# Patient Record
Sex: MALE | Race: WHITE | Employment: FULL TIME | URBAN - NONMETROPOLITAN AREA
[De-identification: names, ages, dates, MRNs, and addresses within clinical notes are randomized per-mention and may not be internally consistent; named-entity substitution may affect disease eponyms.]

---

## 2018-09-06 ENCOUNTER — HOSPITAL ENCOUNTER (OUTPATIENT)
Age: 58
Setting detail: OBSERVATION
Discharge: HOME | End: 2018-09-07
Attending: EMERGENCY MEDICINE | Admitting: HOSPITALIST
Payer: COMMERCIAL

## 2018-09-06 ENCOUNTER — APPOINTMENT (OUTPATIENT)
Dept: CT IMAGING | Age: 58
End: 2018-09-06
Payer: COMMERCIAL

## 2018-09-06 DIAGNOSIS — I67.1 INTRACRANIAL ANEURYSM: Primary | ICD-10-CM

## 2018-09-06 DIAGNOSIS — R53.83 OTHER FATIGUE: ICD-10-CM

## 2018-09-06 DIAGNOSIS — R00.1 BRADYCARDIA: ICD-10-CM

## 2018-09-06 LAB
ALBUMIN SERPL-MCNC: 4.1 G/DL (ref 3.5–5.2)
ALP BLD-CCNC: 71 U/L (ref 40–130)
ALT SERPL-CCNC: 14 U/L (ref 5–41)
ANION GAP SERPL CALCULATED.3IONS-SCNC: 9 MMOL/L (ref 7–19)
AST SERPL-CCNC: 18 U/L (ref 5–40)
BILIRUB SERPL-MCNC: 0.4 MG/DL (ref 0.2–1.2)
BILIRUBIN URINE: NEGATIVE
BLOOD, URINE: NEGATIVE
BUN BLDV-MCNC: 13 MG/DL (ref 8–23)
CALCIUM SERPL-MCNC: 8.9 MG/DL (ref 8.8–10.2)
CHLORIDE BLD-SCNC: 102 MMOL/L (ref 98–111)
CLARITY: CLEAR
CO2: 27 MMOL/L (ref 22–29)
COLOR: YELLOW
CREAT SERPL-MCNC: 0.9 MG/DL (ref 0.5–1.2)
GFR NON-AFRICAN AMERICAN: >60
GLUCOSE BLD-MCNC: 119 MG/DL (ref 74–109)
GLUCOSE URINE: NEGATIVE MG/DL
HCT VFR BLD CALC: 42.5 % (ref 42–52)
HEMOGLOBIN: 14.4 G/DL (ref 14–18)
KETONES, URINE: NEGATIVE MG/DL
LEUKOCYTE ESTERASE, URINE: NEGATIVE
MAGNESIUM: 2 MG/DL (ref 1.6–2.4)
MCH RBC QN AUTO: 31.2 PG (ref 27–31)
MCHC RBC AUTO-ENTMCNC: 33.9 G/DL (ref 33–37)
MCV RBC AUTO: 92.2 FL (ref 80–94)
NITRITE, URINE: NEGATIVE
PDW BLD-RTO: 13.1 % (ref 11.5–14.5)
PERFORMED ON: NORMAL
PERFORMED ON: NORMAL
PH UA: 7.5
PLATELET # BLD: 193 K/UL (ref 130–400)
PMV BLD AUTO: 9.9 FL (ref 9.4–12.4)
POC TROPONIN I: 0 NG/ML (ref 0–0.08)
POC TROPONIN I: 0.01 NG/ML (ref 0–0.08)
POTASSIUM SERPL-SCNC: 3.9 MMOL/L (ref 3.5–5)
PRO-BNP: 230 PG/ML (ref 0–900)
PROTEIN UA: NEGATIVE MG/DL
RBC # BLD: 4.61 M/UL (ref 4.7–6.1)
SEDIMENTATION RATE, ERYTHROCYTE: 3 MM/HR (ref 0–15)
SODIUM BLD-SCNC: 138 MMOL/L (ref 136–145)
SPECIFIC GRAVITY UA: 1.02
TOTAL PROTEIN: 6.7 G/DL (ref 6.6–8.7)
TROPONIN: <0.01 NG/ML (ref 0–0.03)
TSH SERPL DL<=0.05 MIU/L-ACNC: 0.78 UIU/ML (ref 0.27–4.2)
URINE REFLEX TO CULTURE: NORMAL
UROBILINOGEN, URINE: 0.2 E.U./DL
WBC # BLD: 8.5 K/UL (ref 4.8–10.8)

## 2018-09-06 PROCEDURE — 85652 RBC SED RATE AUTOMATED: CPT

## 2018-09-06 PROCEDURE — 99285 EMERGENCY DEPT VISIT HI MDM: CPT | Performed by: EMERGENCY MEDICINE

## 2018-09-06 PROCEDURE — 81003 URINALYSIS AUTO W/O SCOPE: CPT

## 2018-09-06 PROCEDURE — 84484 ASSAY OF TROPONIN QUANT: CPT

## 2018-09-06 PROCEDURE — 70496 CT ANGIOGRAPHY HEAD: CPT

## 2018-09-06 PROCEDURE — 99218 PR INITIAL OBSERVATION CARE/DAY 30 MINUTES: CPT | Performed by: NEUROLOGICAL SURGERY

## 2018-09-06 PROCEDURE — G0378 HOSPITAL OBSERVATION PER HR: HCPCS

## 2018-09-06 PROCEDURE — 83880 ASSAY OF NATRIURETIC PEPTIDE: CPT

## 2018-09-06 PROCEDURE — 93005 ELECTROCARDIOGRAM TRACING: CPT

## 2018-09-06 PROCEDURE — 83735 ASSAY OF MAGNESIUM: CPT

## 2018-09-06 PROCEDURE — 36415 COLL VENOUS BLD VENIPUNCTURE: CPT

## 2018-09-06 PROCEDURE — 85027 COMPLETE CBC AUTOMATED: CPT

## 2018-09-06 PROCEDURE — 70450 CT HEAD/BRAIN W/O DYE: CPT

## 2018-09-06 PROCEDURE — 84443 ASSAY THYROID STIM HORMONE: CPT

## 2018-09-06 PROCEDURE — 99219 PR INITIAL OBSERVATION CARE/DAY 50 MINUTES: CPT | Performed by: HOSPITALIST

## 2018-09-06 PROCEDURE — 6360000004 HC RX CONTRAST MEDICATION: Performed by: EMERGENCY MEDICINE

## 2018-09-06 PROCEDURE — 80053 COMPREHEN METABOLIC PANEL: CPT

## 2018-09-06 PROCEDURE — 99285 EMERGENCY DEPT VISIT HI MDM: CPT

## 2018-09-06 PROCEDURE — 99244 OFF/OP CNSLTJ NEW/EST MOD 40: CPT | Performed by: INTERNAL MEDICINE

## 2018-09-06 RX ORDER — ACYCLOVIR 200 MG/1
200 CAPSULE ORAL DAILY
COMMUNITY

## 2018-09-06 RX ORDER — DIAZEPAM 5 MG/1
10 TABLET ORAL EVERY EVENING
Status: DISCONTINUED | OUTPATIENT
Start: 2018-09-06 | End: 2018-09-07 | Stop reason: HOSPADM

## 2018-09-06 RX ORDER — ACYCLOVIR 200 MG/1
200 CAPSULE ORAL
Status: DISCONTINUED | OUTPATIENT
Start: 2018-09-07 | End: 2018-09-07 | Stop reason: HOSPADM

## 2018-09-06 RX ORDER — DIAZEPAM 10 MG/1
10 TABLET ORAL NIGHTLY
COMMUNITY

## 2018-09-06 RX ADMIN — IOPAMIDOL 90 ML: 755 INJECTION, SOLUTION INTRAVENOUS at 14:14

## 2018-09-06 ASSESSMENT — ENCOUNTER SYMPTOMS
DIARRHEA: 0
SHORTNESS OF BREATH: 0
ABDOMINAL PAIN: 0
EYE PAIN: 0
VOMITING: 0

## 2018-09-06 ASSESSMENT — PAIN SCALES - GENERAL: PAINLEVEL_OUTOF10: 0

## 2018-09-06 NOTE — ED PROVIDER NOTES
[facility]  eMERGENCY dEPARTMENT eNCOUnter      Pt Name: Molly Carrillo  MRN: [id number]  [place] 1960  Date of evaluation: 9/6/2018  Provider: Rajwinder Montgomery MD    Emergency Department care of this patient was assumed at 1500 from Dr. Yogi Salinas. We have discussed the case and the plan of care. I have seen and evaluated patient and reviewed ED course. CHIEF COMPLAINT       Chief Complaint   Patient presents with    Bradycardia         PHYSICAL EXAM    (up to 7 for level 4, 8 or more for level 5)     ED Triage Vitals [09/06/18 1222]   BP Temp Temp Source Pulse Resp SpO2 Height Weight   (!) 161/90 97.4 °F (36.3 °C) Temporal 55 18 96 % 6' 2\" (1.88 m) 194 lb (88 kg)       Physical Exam    DIAGNOSTIC RESULTS         RADIOLOGY:   Non-plain film images such as CT, Ultrasound and MRI are read by the radiologist. Plain radiographic images are visualized and preliminarily interpreted by the emergency physician with the below findings:    CTA HEAD W WO CONTRAST   Final Result   1. 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 This is nonenhancing on the angiogram and I suspect that   it is thrombosed. Signed by Dr Sd De La Torre on 9/6/2018 3:01 PM      CT Head WO Contrast   Final Result   1. 4 mm calcific density projecting at the margin of the fourth   ventricle along the middle cerebellar peduncle. This could reflect rim   calcification along a PICA branch aneurysm. Consider CT brain   angiogram.   2. No subarachnoid hemorrhage identified.    3. Otherwise normal exam.   Signed by Dr Sd De La Torre on 9/6/2018 1:55 PM              LABS:  Labs Reviewed   CBC - Abnormal; Notable for the following:        Result Value    RBC 4.61 (*)     MCH 31.2 (*)     All other components within normal limits   COMPREHENSIVE METABOLIC PANEL - Abnormal; Notable for the following:     Glucose 119 (*)     All other components within normal limits   SEDIMENTATION RATE   TSH WITHOUT REFLEX   URINE RT REFLEX TO CULTURE   POCT TROPONIN   POCT TROPONIN   POCT VENOUS   POCT VENOUS       All other labs were within normal range or not returned as of this dictation. EMERGENCY DEPARTMENT COURSE and DIFFERENTIAL DIAGNOSIS/MDM:   Vitals:    Vitals:    09/06/18 1500 09/06/18 1600 09/06/18 1700 09/06/18 1800   BP: (!) 142/88 138/78 136/74 132/70   Pulse: 51 54 52 52   Resp: 18 18 18 18   Temp:       TempSrc:       SpO2: 96% 95% 96% 97%   Weight:       Height:           MDM      CONSULTS:    Case was reviewed with Dr. Lele Araujo regarding patient's CT angiogram findings along with possible stress test. 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 Case was discussed with Dr. Pretty George who is agreeable for admission to the hospitalist service. Dr. Katja John was notified on consultation. PROCEDURES:  Unless otherwise noted below, none     Procedures    FINAL IMPRESSION      1. Intracranial aneurysm    2. Bradycardia    3.  Other fatigue          DISPOSITION/PLAN   DISPOSITION Admitted    (Please note that portions of this note were completed with a voice recognition program.  Efforts were made to edit the dictations but occasionally words are mis-transcribed.)    Opal Hope MD (electronically signed)  Attending Emergency Physician         Opal Hope MD  09/07/18 7019

## 2018-09-06 NOTE — H&P
History and Physical    Patient Name:  Marcel Bence    :  1960    Chief Complaint:   symptomatic bradycardia     History of Present Illness:   Marcel Bence presents to [facility] with bradycardia and feeling off with associated fatigue. He left work and on its way home became diaphoretic. he went to his PCP who on EKG found him being bradycardic. CT head incidentally revealed a small 3-4 mm hyperdenstiy adjacent to the 4th ventricle which the radiologyist suggested could be and aneurysm which prompted a CTA. Due to his bradycardia, the cardiologist, Dr. Phan Ojeda suggested a stress test be performed and wanted neurosurgery clearance before the procedure.             Past Medical History:   has a past medical history of Herpes. Surgical History:   has a past surgical history that includes Appendectomy. Social History:   reports that he has never smoked. He has never used smokeless tobacco. He reports that he does not drink alcohol or use drugs. Family History:  Father has HTN     Medications:  None     Allergies:  Sulfa antibiotics     Review of Systems:   · Constitutional: there has been no unanticipated weight loss. There's been change in energy level, sleep pattern, or activity level. · Eyes: No visual changes or diplopia. No scleral icterus. · ENT: Headaches, no hearing loss or vertigo. No mouth sores or sore throat. · Cardiovascular: No chest pain, dyspnea on exertion, palpitations or loss of consciousness. No cough, hemoptysis, pleuritic pain, or phlebitis. · Respiratory: No cough or wheezing, no sputum production. No hemoptysis. · Gastrointestinal: No abdominal pain, appetite loss, blood in stools. No change in bowel or bladder habits. · Genitourinary: No dysuria, trouble voiding, or hematuria. · Musculoskeletal:  No gait disturbance, weakness or joint complaints. · Integumentary: No rash or pruritis.   · Neurological: headache, no diplopia, change in muscle strength, numbness or tingling. No change in gait, balance, coordination, mood, affect, memory, mentation, behavior. · Psychiatric: No anxiety, or depression. · Endocrine: No temperature intolerance. No excessive thirst, fluid intake, or urination. No tremor. · Hematologic/Lymphatic: No abnormal bruising or bleeding, blood clots or swollen lymph nodes. · Allergic/Immunologic: No nasal congestion or hives. Physical Examination:    Vital Signs: /70   Pulse 52   Temp 97.4 °F (36.3 °C) (Temporal)   Resp 18   Ht 6' 2\" (1.88 m)   Wt 194 lb (88 kg)   SpO2 97%   BMI 24.91 kg/m²   General appearance: Well preserved, mesomorphic body habitus, alert, no distress. Skin: Skin color, texture, turgor normal. No rashes or lesions. No induration or tightening palpated. Head: Normocephalic. No masses, lesions, tenderness or abnormalities  Eyes: conjunctivae/corneas clear. EOM's intact. Sclera non icteric. Ears: External ears normal.  Hearing normal to finger rub. Nose/Sinuses: Nares normal. Septum midline. Mucosa normal. No drainage or sinus tenderness. Oropharynx: Lips, mucosa, and tongue normal.   Neck: Neck supple, and symmetric. No adenopathy. Trachea is midline. Carotids brisk in upstroke without bruits, No abnormal JVP noted at 45°. Lungs: Lungs clear to auscultation bilaterally. No retractions or use of accessory muscles. No vocal fremitus. No ronchi, crackle or rale. Heart:  S1 > S2.  Bradycardic. No gallop, murmur,rub, palpable thrill or heave noted. Abdomen: Abdomen soft, non-tender. BS normal. No masses, organomegaly. No hernia noted. Extremities: Extremities normal. No deformities, edema, or skin discoloration. No cyanosis or clubbing noted to the nails. Peripheral pulses 4/4. Musculoskeletal: Spine ROM normal. Muscular strength intact. Neuro: Cranial nerves intact. Motor: Strength 5/5 in all extremities. . No focal weakness. Sensory: grossly normal to touch.    Pertinent Labs:  CBC:   Recent Labs      09/06/18   1315   WBC  8.5   RBC  4.61*   HGB  14.4   HCT  42.5   MCV  92.2   MCH  31.2*   MCHC  33.9   RDW  13.1   PLT  193   MPV  9.9     BMP:   Recent Labs      09/06/18   1315   NA  138   K  3.9   CL  102   CO2  27   BUN  13   CREATININE  0.9   GLUCOSE  119*   CALCIUM  8.9     TSH:   Lab Results   Component Value Date    TSH 0.777 09/06/2018     Cardiac Injury Profile:   Lab Results   Component Value Date    TROPONINI 0.01 09/06/2018       Assessment/Plan:  · Symptomatic bradycardia - stress test - cardiology consulted  · Brain hyper density- calcification vs thrombosed aneurysm- per neurosurgery                     I have reviewed my findings and recommendations in detail with Jewell Modi.     Yasmany Jones

## 2018-09-06 NOTE — ED PROVIDER NOTES
[facility]  eMERGENCY dEPARTMENT eNCOUnter      Pt Name: Bradly Hamilton  MRN: [id number]  [place] 1960  Date of evaluation: 9/6/2018  Provider: Lani Godinez MD    [address] [place] [address]       Chief Complaint   Patient presents with    Bradycardia         HISTORY OF PRESENT ILLNESS   (Location/Symptom, Timing/Onset, Context/Setting, Quality, Duration, Modifying Factors, Severity)  Note limiting factors. Bradly Hamilton is a 59 y.o. male who presents to the emergency department Due to low heart rate. Patient said that when he got up this morning he felt a \"little off\". Cannot really describe this in much more detail. Said that he ate his breakfast and it didn't taste quite right. Went to work and began feeling fatigued. Said he had to lay down on the conference table at work because he was not feeling well. Again, cannot really give me much more detail other than to say he feels fatigued. Said he felt like he was going to begin feeling nauseated but never did. Left work and went home. On the way home became diaphoretic. Diaphoresis lasted a few minutes and has since resolved. Got into his bed. His wife checked his vital signs and noticed his heart rate was low so she took the patient to see his primary doctor. Primary doctor did an EKG and sent patient here because of bradycardia. Has an EKG with him that shows sinus bradycardia with rate in the 40s. Patient said that he feels better now. Still feels a little fatigued. Complains of a mild left-sided headache. This is described as mild. No vision changes. No numbness or weakness. No chest pain of any kind and has never had any chest pain. No palpitations. No dyspnea. No abdominal pain. No vomiting or diarrhea. No urinary symptoms. No unilateral leg swelling or pain. No history of DVT or PE.    HPI    Nursing Notes were reviewed.     REVIEW OF SYSTEMS    (2-9 systems for level 4, 10 or more for level 5)     Review of Systems   Constitutional: Positive for distress. HENT:   Head: Normocephalic and atraumatic. Right Ear: External ear normal.   Left Ear: External ear normal.   No temporal artery tenderness   Eyes: Pupils are equal, round, and reactive to light. EOM are normal. No scleral icterus. Right eye exhibits no nystagmus. Left eye exhibits no nystagmus. Neck: Normal range of motion. Neck supple. No JVD present. Cardiovascular: Normal rate, regular rhythm, normal heart sounds and intact distal pulses. Pulmonary/Chest: Effort normal and breath sounds normal. No respiratory distress. Abdominal: Soft. He exhibits no distension. There is no tenderness. There is no rebound and no guarding. Musculoskeletal: He exhibits no edema or tenderness. Neurological: He is alert and oriented to person, place, and time. He has normal strength. No cranial nerve deficit or sensory deficit. Coordination normal. GCS eye subscore is 4. GCS verbal subscore is 5. GCS motor subscore is 6. Normal finger-to-nose bilaterally. No pronator drift. No visual field deficit. Normal heel-to-shin bilaterally. Skin: Skin is warm and dry. Psychiatric: He has a normal mood and affect. His behavior is normal.   Vitals reviewed. DIAGNOSTIC RESULTS     EKG: All EKG's are interpreted by the Emergency Department Physician who either signs or Co-signs this chart in the absence of a cardiologist.    Sinus bradycardia. Normal QT interval. No evidence of acute ischemia. Repeat EKG shows sinus bradycardia. Normal QT interval. Nonspecific intraventricular conduction delay. No evidence of acute ischemia. RADIOLOGY:   Non-plain film images such as CT, Ultrasound and MRI are read by the radiologist. Plain radiographic images are visualized and preliminarily interpreted by the emergency physician with the below findings:    Interpretation per the Radiologist below, if available at the time of this note:    CTA HEAD W WO CONTRAST   Final Result   1.  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 This is nonenhancing on the angiogram and I suspect that   it is thrombosed. Signed by Dr Nery Hernandez on 9/6/2018 3:01 PM      CT Head WO Contrast   Final Result   1. 4 mm calcific density projecting at the margin of the fourth   ventricle along the middle cerebellar peduncle. This could reflect rim   calcification along a PICA branch aneurysm. Consider CT brain   angiogram.   2. No subarachnoid hemorrhage identified. 3. Otherwise normal exam.   Signed by Dr Nery Hernandez on 9/6/2018 1:55 PM            LABS:  Labs Reviewed   CBC - Abnormal; Notable for the following:        Result Value    RBC 4.61 (*)     MCH 31.2 (*)     All other components within normal limits   COMPREHENSIVE METABOLIC PANEL - Abnormal; Notable for the following:     Glucose 119 (*)     All other components within normal limits   SEDIMENTATION RATE   TSH WITHOUT REFLEX   URINE RT REFLEX TO CULTURE   POCT VENOUS   POCT VENOUS   POCT TROPONIN   POCT TROPONIN       All other labs were within normal range or not returned as of this dictation. EMERGENCY DEPARTMENT COURSE and DIFFERENTIAL DIAGNOSIS/MDM:   Vitals:    Vitals:    09/06/18 1300 09/06/18 1400 09/06/18 1500 09/06/18 1600   BP: (!) 144/86 136/86 (!) 142/88 138/78   Pulse: (!) 48 52 51 54   Resp: 18 18 18 18   Temp:       TempSrc:       SpO2: 96% 95% 96% 95%   Weight:       Height:           MDM  I spoke with JULIA Santillan with Dr. Katja John concerning the small aneurysm seen on CTA. She discussed the case with Dr. Katja John. Dr. Katja John said that patient can follow up with him about this in the clinic and that he does not need to be admitted to the hospital for this. He said that it is okay for patient to have stress test.    Patient resting comfortable at this time. In no distress.  Nontoxic on exam. Case discussed with Dr. Lele Araujo who will see in consult with plans for stress test. Case discussed with Dr. Annemarie Smith who will be assuming care. If stress test is negative and patient is cleared by Dr. Yoseph Sprague patient will likely be discharged. Patient and family updated about plan and are agreeable with plan. CONSULTS:  IP CONSULT TO CARDIOLOGY    PROCEDURES:  Unless otherwise noted below, none     Procedures    FINAL IMPRESSION      1. Intracranial aneurysm    2. Bradycardia    3. Other fatigue          DISPOSITION/PLAN   DISPOSITION        PATIENT REFERRED TO:  No follow-up provider specified.     DISCHARGE MEDICATIONS:  New Prescriptions    No medications on file          (Please note that portions of this note were completed with a voice recognition program.  Efforts were made to edit the dictations but occasionally words are mis-transcribed.)    Kayce Burr MD (electronically signed)  Attending Emergency Physician        Kayce Burr MD  09/08/18 8401

## 2018-09-07 ENCOUNTER — APPOINTMENT (OUTPATIENT)
Dept: NUCLEAR MEDICINE | Age: 58
End: 2018-09-07
Payer: COMMERCIAL

## 2018-09-07 VITALS
RESPIRATION RATE: 18 BRPM | DIASTOLIC BLOOD PRESSURE: 86 MMHG | SYSTOLIC BLOOD PRESSURE: 141 MMHG | HEART RATE: 60 BPM | OXYGEN SATURATION: 94 % | WEIGHT: 188.2 LBS | HEIGHT: 74 IN | BODY MASS INDEX: 24.15 KG/M2 | TEMPERATURE: 99 F

## 2018-09-07 LAB
EKG P AXIS: 39 DEGREES
EKG P AXIS: 71 DEGREES
EKG P-R INTERVAL: 144 MS
EKG P-R INTERVAL: 156 MS
EKG Q-T INTERVAL: 464 MS
EKG Q-T INTERVAL: 480 MS
EKG QRS DURATION: 98 MS
EKG QRS DURATION: 98 MS
EKG QTC CALCULATION (BAZETT): 452 MS
EKG QTC CALCULATION (BAZETT): 459 MS
EKG T AXIS: 50 DEGREES
EKG T AXIS: 56 DEGREES
TROPONIN: <0.01 NG/ML (ref 0–0.03)

## 2018-09-07 PROCEDURE — 84484 ASSAY OF TROPONIN QUANT: CPT

## 2018-09-07 PROCEDURE — 6370000000 HC RX 637 (ALT 250 FOR IP): Performed by: HOSPITALIST

## 2018-09-07 PROCEDURE — 3430000000 HC RX DIAGNOSTIC RADIOPHARMACEUTICAL: Performed by: INTERNAL MEDICINE

## 2018-09-07 PROCEDURE — G0378 HOSPITAL OBSERVATION PER HR: HCPCS

## 2018-09-07 PROCEDURE — 6360000002 HC RX W HCPCS: Performed by: INTERNAL MEDICINE

## 2018-09-07 PROCEDURE — 93017 CV STRESS TEST TRACING ONLY: CPT

## 2018-09-07 PROCEDURE — 78452 HT MUSCLE IMAGE SPECT MULT: CPT

## 2018-09-07 PROCEDURE — 36415 COLL VENOUS BLD VENIPUNCTURE: CPT

## 2018-09-07 PROCEDURE — 99217 PR OBSERVATION CARE DISCHARGE MANAGEMENT: CPT | Performed by: HOSPITALIST

## 2018-09-07 PROCEDURE — A9500 TC99M SESTAMIBI: HCPCS | Performed by: INTERNAL MEDICINE

## 2018-09-07 RX ADMIN — REGADENOSON 0.4 MG: 0.08 INJECTION, SOLUTION INTRAVENOUS at 11:41

## 2018-09-07 RX ADMIN — ACYCLOVIR 200 MG: 200 CAPSULE ORAL at 11:06

## 2018-09-07 RX ADMIN — TETRAKIS(2-METHOXYISOBUTYLISOCYANIDE)COPPER(I) TETRAFLUOROBORATE 10 MILLICURIE: 1 INJECTION, POWDER, LYOPHILIZED, FOR SOLUTION INTRAVENOUS at 11:40

## 2018-09-07 RX ADMIN — TETRAKIS(2-METHOXYISOBUTYLISOCYANIDE)COPPER(I) TETRAFLUOROBORATE 30 MILLICURIE: 1 INJECTION, POWDER, LYOPHILIZED, FOR SOLUTION INTRAVENOUS at 11:41

## 2018-09-07 NOTE — DISCHARGE SUMMARY
condition. Results: Patient had symptoms of dyspnea during infusion that resolved  in recovery. Baseline EKG showed sinus rhythm. During stress there  were no significant EKG changes or rhythm changes. Baseline and peak  blood pressures were 136/95, and 136/95 respectively.  Baseline and  peak heart rates were 70 and  126 respectively. [facility] Nuclear Medicine Report  Date of Procedure: 9/7/2018  The patient was injected with 04.8 millicuries (mCi) of Technetium  (Tc99m).  After an appropriate level of stress the patient was  re-injected with 49.0 millicuries (mCi) of Technetium (Tc99m).  Repeat  gated images were then performed per standard protocol. Findings:  1.  Analysis of the the stress and rest images reveals inferior MI +  ischemia. 2.  Analysis of the gated images reveals grossly normal left  ventricular function with a calculated ejection fraction of 63 %. 3. Cecillia Fujita is 6 % ischemic myocardium at risk on stress and 6 %  ischemic myocardium at risk on rest.         Impression:       Impression:  There is inferior MI + ischemia, with a calculated ejection fraction  of 63 % with a 6 % of ischemic burden. Suggest: cardiac catheterization  Signed by Dr Henry Hirsch on 9/10/2018 1:35 PM     CTA HEAD W [address] [[id number]] Resulted: 09/06/18 1601     Order Status: Completed Updated: 09/06/18 1503     Narrative:       CTA HEAD W WO CONTRAST 9/6/2018 1:00 PM  HISTORY: Headache, abnormal noncontrast brain CT  COMPARISON: CT scan dated 9/6/2018   DLP: 433 mGy cm  TECHNIQUE: Precontrast tomographic images of the brain were obtained. Following the uneventful administration of Isovue contrast, serial  helical tomographic images of the brain were obtained following  angiogram protocol. Multiplanar MIP and 3D reconstructions were also  obtained.    FINDINGS:   ANGIOGRAM:   The visualized extracranial carotid vasculature is patent bilaterally,  without evidence of aneurysm, dissection, vessel cutoff, or  flow-limiting stenosis. Bilateral intracranial portions of the ICA's  demonstrate no evidence of aneurysm, dissection, vessel cutoff, or  flow-limiting stenosis. The ACAs and MCAs are unremarkable. Intracranial vertebral arteries and basilar artery are unremarkable. The PCAs are unremarkable. Previously identified rim calcified lesion  at the margin of the fourth ventricle does not enhance, however, it  does appear to arise from a right PICA branch. I suspect that this is  a thrombosed 3 mm saccular aneurysm arising from a PICA branch (series  [id number] [id number]). OTHER FINDINGS:  The osseous calvarium is intact. The surrounding soft tissues are  unremarkable. The paranasal sinuses and bilateral mastoid air cells  are clear. The imaged portions of the bilateral globes and orbits are  unremarkable.      Impression:       1. 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 This is nonenhancing on the angiogram and I suspect that  it is thrombosed. Signed by Dr Kathy Lagos on 9/6/2018 3:01 PM     CT Head WO Contrast [[id number]] Resulted: 09/06/18 1455     Order Status: Completed Updated: 09/06/18 1357     Narrative:       CT HEAD WO CONTRAST 9/6/2018 12:00 PM  HISTORY: Headache  COMPARISON: None   DOSE LENGTH PRODUCT: 949 mGy cm  TECHNIQUE: Helical tomographic images of the brain were obtained  without the use of intravenous contrast.   FINDINGS:   A 4 mm rounded density/ calcification is seen just medial to the right  middle cerebellar peduncle (series 2-image 7). There is no evidence of  evolving large vascular territory infarct. No visualized intra-axial  or extra-axial hemorrhage. No mass lesion is identified. Normal size  and configuration of the ventricular system. The basal cisterns are  symmetric. The included orbits and their contents are unremarkable.  The  visualized paranasal sinuses, mastoid air cells and middle ear  cavities are clear. The visualized osseous structures and overlying  soft tissues of the skull and face are unremarkable.      Impression:       1. 4 mm calcific density projecting at the margin of the fourth  ventricle along the middle cerebellar peduncle. This could reflect rim  calcification along a PICA branch aneurysm. Consider CT brain  angiogram.  2. No subarachnoid hemorrhage identified. 3. Otherwise normal exam.  Signed by Dr Daren Jay on 9/6/2018 1:55 PM           Discharge Exam:  BP (!) 141/86   Pulse 60   Temp 99 °F (37.2 °C) (Temporal)   Resp 18   Ht 6' 2\" (1.88 m)   Wt 188 lb 3.2 oz (85.4 kg)   SpO2 94%   BMI 24.16 kg/m²     General appearance: alert, appears stated age and cooperative  Skin: Skin color, texture, turgor normal.   HEENT: Head: Normocephalic, no lesions, without obvious abnormality. Neck: no adenopathy, no carotid bruit, no JVD and supple, symmetrical, trachea midline  Lungs: clear to auscultation bilaterally  Heart: regular rate and rhythm, S1, S2 normal, no murmur, click, rub or gallop  Abdomen: soft, non-tender; bowel sounds normal; no masses,  no organomegaly  Extremities: extremities normal, atraumatic, no cyanosis or edema  Lymphatic: No significant lymph node enlargement papable  Neurologic: Mental status: Alert, oriented, thought content appropriate  Discharge Medications:       [facility] OJK:[id number]    Printed on:10/15/18 1910   Medication Information                      acyclovir (ZOVIRAX) 200 MG capsule  Take 200 mg by mouth daily             diazepam (VALIUM) 10 MG tablet  Take 10 mg by mouth nightly. Radames Counter Discharge Instructions: Follow up with Dr. Carlos Regan M.D., MD in 5 days. Take medications as directed. Resume activity as tolerated. Disposition: Patient is medically stable and will be discharged home.      Dc time 22 min          Graciela Bill MD

## 2018-09-07 NOTE — PLAN OF CARE
Problem: Safety:  Goal: Free from accidental physical injury  Free from accidental physical injury   Outcome: Met This Shift    Goal: Free from intentional harm  Free from intentional harm   Outcome: Met This Shift      Problem: Daily Care:  Goal: Daily care needs are met  Daily care needs are met   Outcome: Met This Shift      Problem: Cardiac Output - Decreased:  Goal: Hemodynamic stability will improve  Hemodynamic stability will improve   Outcome: Met This Shift

## 2018-09-07 NOTE — PROGRESS NOTES
4 Eyes Skin Assessment    Jeovanny Perla is being assessed upon: Admission    I agree that I, George Crowley, along with Rikki Corona have performed a thorough Head to Toe Skin Assessment on the patient. ALL assessment sites listed below have been assessed. Areas assessed by both nurses:     [x]   Head, Face, and Ears   [x]   Shoulders, Back, and Chest  [x]   Arms, Elbows, and Hands   [x]   Coccyx, Sacrum, and Ischium  [x]   Legs, Feet, and Heels    Does the Patient have Skin Breakdown?  No    Jordan Prevention initiated: Yes  Wound Care Orders initiated: NA    [facility] nurse consulted for Pressure Injury (Stage 3,4, Unstageable, DTI, NWPT, and Complex wounds) and New or Established Ostomies: NA        Primary Nurse eSignature: George Crowley RN on 9/6/2018 at 11:03 PM      Co-Signer eSignature: Electronically signed by King Pisano RN on 9/6/18 at 11:06 PM

## 2018-09-07 NOTE — PROGRESS NOTES
Charge nurse notified Dr. Sajan Barbosa of patient admission to PCU.  Electronically signed by Gregorio Sandhu RN on 9/6/2018 at 11:26 PM

## 2018-09-07 NOTE — PLAN OF CARE
Problem: Nutrition  Goal: Optimal nutrition therapy  Nutrition Problem: Inadequate oral intake  Intervention: Food and/or Nutrient Delivery: Continue NPO  Nutritional Goals: when diet resumed, po intake 75% or greater.     Outcome: Ongoing

## 2018-09-07 NOTE — PROGRESS NOTES
Dr. Richard Calabrese was rounding on other patients and was notified of cardiology consult.   Electronically signed by Roberto Castillo RN on 9/6/2018 at 11:22 PM'

## 2018-09-07 NOTE — CONSULTS
[facility]       Cardiology Consultation          I personally saw the patient and rounded with: Anton Mccormick RN, on  9/6/18      The observations documented in this note, including the assessment and plan are mine              Date of Admission:  9/6/2018 12:24 PM    Date of Initially Being Seen / Consultation:  9/6/18    Cardiologist:  Andrew Burrell MD     Cardiology Attending: [facility] Attending: ROMANA     PCP:  Dr. Tonja Valdez M.D., MD    Reason for Consultation or Admission / Chief Complaint:  Did not feel right    SUBJECTIVE AND HISTORY OF PRESENT ILLNESS:    Source of the history:  Patient, family, previous inpatient and outpatient records in [facility] Lizz is a 59 y.o. male who presents to [facility] ED / PCU with symptoms / signs / problem or diagnosis of \"not feeling right\". He was at work and had a dull headache. He layed down on a table. He was nausated and then broke out in a sweat. He went to see his pcp who noticed he was bradycardic and suggested that he come to the ed. He really did not have any chest discomfort. He had a head CT which revealed some type of abnormality and the ED wanted a stress test to be preformed. I was hesitant because of the abnormality and there fore he was admitted. He has not antecedent history from a cardiovascular perspective. When being examined this evening, he has had no symptoms of exertional chest discomfort, unusual or change in shortness of air, presyncope or syncope.        Family present:  Yes: wife who is a nurse and keeps her licence, she says      CARDIAC RISK PROFILE:    Risk Factor Yes / No / Unknown       Gender Male   Cigarette Use No, but did use in the past    Family History of Cardiovascular Disease Yes: heart attack, diabetes   Diabetes Mellitus no   Hypercholesteremia no   Hypertension no          Cardiac Specific Problems:    Specialty Problems     None            PRIOR CARDIAC PROBLEM LIST

## 2018-09-07 NOTE — PROGRESS NOTES
Patient is educated on dc instructions and follow up appointments. Patient verbalized understanding. Patient walks out accompanied by nurse.

## 2018-09-07 NOTE — PROGRESS NOTES
Pepe Bruno arrived to room # 710-2. Presented with: Bradycardia  Mental Status: Patient is oriented and alert. Vitals:    09/06/18 2042   BP: (!) 149/90   Pulse: 59   Resp: 18   Temp: 96.6 °F (35.9 °C)   SpO2: 95%     Patient safety contract and falls prevention contract reviewed with patient Yes. Oriented Patient to room. Call light within reach. Yes. Needs, issues or concerns expressed at this time: yes, explained to patient that he would be having a stress test tomorrow, voiced understanding. Explained to patient that though he assesses out as a low risk on assessment that please call for assistance when going to bathroom due to his low heart rate, voiced understanding.       Electronically signed by Juancarlos Rice RN on 9/6/2018 at 11:13 PM

## 2018-09-10 PROBLEM — R93.1 ABNORMAL NUCLEAR CARDIAC IMAGING TEST: Status: ACTIVE | Noted: 2018-09-10

## 2018-09-10 LAB
LV EF: 63 %
LVEF MODALITY: NORMAL

## 2018-09-14 ENCOUNTER — HOSPITAL ENCOUNTER (OUTPATIENT)
Dept: CARDIAC CATH/INVASIVE PROCEDURES | Age: 58
Discharge: HOME | End: 2018-09-14
Attending: INTERNAL MEDICINE | Admitting: INTERNAL MEDICINE
Payer: COMMERCIAL

## 2018-09-14 ENCOUNTER — APPOINTMENT (OUTPATIENT)
Dept: GENERAL RADIOLOGY | Age: 58
End: 2018-09-14
Attending: INTERNAL MEDICINE
Payer: COMMERCIAL

## 2018-09-14 ENCOUNTER — HOSPITAL ENCOUNTER (OUTPATIENT)
Dept: GENERAL RADIOLOGY | Age: 58
End: 2018-09-14
Payer: COMMERCIAL

## 2018-09-14 VITALS
OXYGEN SATURATION: 98 % | HEIGHT: 74 IN | HEART RATE: 65 BPM | SYSTOLIC BLOOD PRESSURE: 125 MMHG | WEIGHT: 188 LBS | RESPIRATION RATE: 15 BRPM | TEMPERATURE: 99.6 F | DIASTOLIC BLOOD PRESSURE: 70 MMHG | BODY MASS INDEX: 24.13 KG/M2

## 2018-09-14 DIAGNOSIS — R94.39 ABNORMAL STRESS TEST: ICD-10-CM

## 2018-09-14 PROCEDURE — C1760 CLOSURE DEV, VASC: HCPCS

## 2018-09-14 PROCEDURE — 2500000003 HC RX 250 WO HCPCS

## 2018-09-14 PROCEDURE — 99999 PR OFFICE/OUTPT VISIT,PROCEDURE ONLY: CPT | Performed by: INTERNAL MEDICINE

## 2018-09-14 PROCEDURE — 2580000003 HC RX 258: Performed by: INTERNAL MEDICINE

## 2018-09-14 PROCEDURE — 2709999900 HC NON-CHARGEABLE SUPPLY

## 2018-09-14 PROCEDURE — C1894 INTRO/SHEATH, NON-LASER: HCPCS

## 2018-09-14 PROCEDURE — 71046 X-RAY EXAM CHEST 2 VIEWS: CPT

## 2018-09-14 PROCEDURE — 93458 L HRT ARTERY/VENTRICLE ANGIO: CPT | Performed by: INTERNAL MEDICINE

## 2018-09-14 PROCEDURE — 99024 POSTOP FOLLOW-UP VISIT: CPT | Performed by: INTERNAL MEDICINE

## 2018-09-14 PROCEDURE — 6360000002 HC RX W HCPCS

## 2018-09-14 RX ORDER — SODIUM CHLORIDE 0.9 % (FLUSH) 0.9 %
10 SYRINGE (ML) INJECTION PRN
Status: DISCONTINUED | OUTPATIENT
Start: 2018-09-14 | End: 2018-09-14 | Stop reason: HOSPADM

## 2018-09-14 RX ORDER — SODIUM CHLORIDE 0.9 % (FLUSH) 0.9 %
10 SYRINGE (ML) INJECTION EVERY 12 HOURS SCHEDULED
Status: DISCONTINUED | OUTPATIENT
Start: 2018-09-14 | End: 2018-09-14 | Stop reason: HOSPADM

## 2018-09-14 RX ORDER — SODIUM CHLORIDE 9 MG/ML
INJECTION, SOLUTION INTRAVENOUS CONTINUOUS
Status: DISCONTINUED | OUTPATIENT
Start: 2018-09-14 | End: 2018-09-14 | Stop reason: HOSPADM

## 2018-09-14 RX ADMIN — SODIUM CHLORIDE: 9 INJECTION, SOLUTION INTRAVENOUS at 14:36

## 2018-09-14 NOTE — PROCEDURES
Cardiac Risk Profile -       Risk Factor Yes / No / Unknown         Gender Male   Cigarette Use No, but did use in the past    Family History of Cardiovascular Disease Yes: heart attack, diabetes   Diabetes Mellitus no   Hypercholesteremia no   Hypertension no         Prior Cardiac History -    9/7/2018  lexiscan Positive for inferior MI + myocardial ischemia, EF 63%, 6% ischemic myocardium on stress, intermediate risk findings, AUC indication 16, AUC score 7    Indications for Cardiac Catheterization -  9/7/2018  lexiscan Positive for inferior MI + myocardial ischemia, EF 63%, 6% ischemic myocardium on stress, intermediate risk findings, AUC indication 16, AUC score 7, Diagnostic Catheterization Appropriate Use Criteria Description, [facility] 2012;59:[id number]    After obtaining informed written consent, the patient was brought to the catheterization laboratory where the right groin was prepared in the usual sterile fashion. 2% lidocaine was injected above the common femoral artery. Utilizing ultrasound assistance and the Seldinger technique, the common femoral artery was cannulated and bright red pulsatile blood returned. Using fluoroscopy guidance, the J-tip wire was placed in the common femoral artery. A 6-Fr sheath was inserted. Utilizing 6-Georgian Aryan catheters, selective coronary arteriography was performed followed by left ventriculography. At this stage, the equipment was removed. A right femoral arteriogram was performed to ensure patency of the vessel so that a vascular access closure device could be deployed. A 6-Georgian Mynx vascular access closure device was then inserted. Hemostasis was achieved. A sterile dressing was applied. He was taken to his room in stable and satisfactory condition. The results of the procedure were discussed with the patient's family in there CVI holding room.      Complications:  none      Results:    Hemodynamics:      Site Pressure mmHg        Left ventricular pressure 134/3 mmHg   Left ventricular end-diastolic pressure (LVEDP) - mmHg   Aortic pressure 118/60 mmHg   Mean aortic pressure 91 mmHg       Angiography:    Coronary Arteries:    Left Main Coronary Artery:  A large vessel which arises from the left sinus of Valsalva. It divides into the left anterior descending coronary artery and the left circumflex. There is mild diffuse disease throughout the entire length of the vessel. Left Anterior Coronary Artery:  The LAD is a moderate sized vessel with several diagonal branches. There is mild diffuse disease throughout the entire length of the vessel. Left Circumflex Coronary Artery:  The LCx is a moderate sized vessel with several marginal branches. There is mild diffuse disease throughout the entire length of the vessel. Right Coronary Artery:  The RCA is a moderate sized dominant vessel which arises from the right sinus of Valsalva. There is mild diffuse disease throughout the entire length of the vessel. Left Ventriculogram:  The left ventriculogram is obtained in the right oblique projection. All regional wall segments move appropriately. The estimated visual ejection fraction is > 60%. There is no mitral regurgitation nor is there a pull back gradient seen across the aortic valve. Summary:      1. Successful femoral artery ultrasound  2. Successful femoral artery arteriogram  3. Mild coronary artery disease  4. Left ventricular function is normal      RECOMMENDATIONS:    1.  Reassurance  2. Risk factor modification  3. Evaluation of etiologies of non cardiac chest discomfort should symptoms persist or progress  4. Follow  Up with Primary care provider as arranged  5.   Follow up with Cardiology \"prn\"       Electronically signed by Shirin Bateman MD on 9/14/18

## 2018-09-14 NOTE — H&P
myocardium on stress, intermediate risk findings, AUC indication 16, AUC score 7    The patient wished to go home and return for the elective cardiac catheterization today    He was electively admitted for cardiac catheterization  . When being examined this afternoon, he has had no symptoms of exertional chest discomfort, unusual or change in shortness of air, presyncope or syncope. Family present:  Yes: wife      CARDIAC RISK PROFILE:       Risk Factor Yes / No / Unknown         Gender Male   Cigarette Use No, but did use in the past    Family History of Cardiovascular Disease Yes: heart attack, diabetes   Diabetes Mellitus no   Hypercholesteremia no   Hypertension no       Cardiac Specific Problems:    Specialty Problems        Cardiology Problems    Intracranial aneurysm                PRIOR CARDIAC PROBLEM LIST  (IF APPLICABLE):    1/5/7285  lexiscan Positive for inferior MI + myocardial ischemia, EF 63%, 6% ischemic myocardium on stress, intermediate risk findings, AUC indication 16, AUC score 7      Past Medical History:    Past Medical History:   Diagnosis Date    Arthritis     Herpes     Trigeminal neuralgia of right side of face          Past Surgical History:    Past Surgical History:   Procedure Laterality Date    APPENDECTOMY           Home Medications:   Prior to Admission medications    Medication Sig Start Date End Date Taking? Authorizing Provider   diazepam (VALIUM) 10 MG tablet Take 10 mg by mouth nightly. .   Yes Historical Provider, MD   acyclovir (ZOVIRAX) 200 MG capsule Take 200 mg by mouth daily   Yes Historical Provider, MD        Facility Administered Medications:    sodium chloride flush  10 mL Intravenous 2 times per day       Allergies:  Sulfa antibiotics     Social History:       Social History     Social History    Marital status:      Spouse name: N/A    Number of children: N/A    Years of education: N/A     Occupational History    Not on file.      Social History Continue current medications:     Yes:            2. bradycardia Initial presentation during this evaluation   Pulse Readings from Last 1 Encounters:   09/14/18 56      No presyncope or syncope   Yes:  Continue current medications:    Yes:            3. Systemic arterial hypertension Initial presentation during this evaluation Systolic (67EGI), WXK:772 , Min:151 , GSP:279    Diastolic (05FEB), FKE:79, Min:85, Max:85   No Continue current medications:       yes         PLAN:    1. Continue present medications except for changes as noted above  2. Continue to monitor rhythm  3. Further orders per clinical course. 4. Cardiac catheterization  5. I have discussed the risks, benefits and options with the patient and his family. They appear to understand, have no questions, and wish to proceed. This procedure is scheduled for today. Discussed with patient and spouse and nursing.     I greatly appreciate the opportunity and your confidence in allowing me to participate in the care of [facility]    Electronically signed by Efrain Neumann MD on 9/14/18     [facility] [place]

## 2018-09-19 ENCOUNTER — TELEPHONE (OUTPATIENT)
Dept: NEUROSURGERY | Age: 58
End: 2018-09-19

## 2018-09-19 DIAGNOSIS — I67.1 INTRACRANIAL ANEURYSM: Primary | ICD-10-CM

## 2018-10-22 PROBLEM — I25.10 MILD CAD: Status: ACTIVE | Noted: 2018-10-22

## 2019-03-28 RX ORDER — DIAZEPAM 10 MG/1
TABLET ORAL
Qty: 30 TABLET | Refills: 5 | OUTPATIENT
Start: 2019-03-28

## 2019-03-28 NOTE — TELEPHONE ENCOUNTER
I told patient that they would need an appointment for a urine drug screen and contract before anything could be sent in. I also told the patient they would need an appointment with the physician. Patient declined any appointments at this time.

## 2019-06-26 RX ORDER — ACYCLOVIR 400 MG/1
TABLET ORAL
Qty: 60 TABLET | Refills: 5 | Status: SHIPPED | OUTPATIENT
Start: 2019-06-26 | End: 2019-12-26

## 2019-11-26 RX ORDER — DIAZEPAM 10 MG/1
TABLET ORAL
Qty: 30 TABLET | Refills: 5 | OUTPATIENT
Start: 2019-11-26

## 2019-12-26 RX ORDER — ACYCLOVIR 400 MG/1
TABLET ORAL
Qty: 60 TABLET | Refills: 0 | Status: SHIPPED | OUTPATIENT
Start: 2019-12-26 | End: 2020-02-07 | Stop reason: SDUPTHER

## 2019-12-30 RX ORDER — DIAZEPAM 10 MG/1
TABLET ORAL
Qty: 30 TABLET | Refills: 5 | OUTPATIENT
Start: 2019-12-30

## 2019-12-30 NOTE — TELEPHONE ENCOUNTER
Patient needs appt.  Not seen in office in over year and half.  Valium has been continued to be filled since 02/19 and patient has been advised multiple times that he needs appt. UDS and S Contract.

## 2020-02-03 RX ORDER — ACYCLOVIR 400 MG/1
TABLET ORAL
Qty: 60 TABLET | Refills: 0 | OUTPATIENT
Start: 2020-02-03

## 2020-02-03 NOTE — TELEPHONE ENCOUNTER
Patient not seen in office since 2018 and has been advised previously that he needs an appt and continues to not make appts but has medications filled.

## 2020-02-07 ENCOUNTER — OFFICE VISIT (OUTPATIENT)
Dept: FAMILY MEDICINE CLINIC | Facility: CLINIC | Age: 60
End: 2020-02-07

## 2020-02-07 VITALS
WEIGHT: 199 LBS | SYSTOLIC BLOOD PRESSURE: 154 MMHG | OXYGEN SATURATION: 98 % | BODY MASS INDEX: 26.37 KG/M2 | TEMPERATURE: 98.5 F | HEART RATE: 60 BPM | HEIGHT: 73 IN | DIASTOLIC BLOOD PRESSURE: 86 MMHG

## 2020-02-07 DIAGNOSIS — R51.9 FACIAL PAIN: ICD-10-CM

## 2020-02-07 DIAGNOSIS — R03.0 SITUATIONAL HYPERTENSION: ICD-10-CM

## 2020-02-07 DIAGNOSIS — G50.0 TRIGEMINAL NEURALGIA OF RIGHT SIDE OF FACE: Primary | ICD-10-CM

## 2020-02-07 PROCEDURE — 96372 THER/PROPH/DIAG INJ SC/IM: CPT | Performed by: NURSE PRACTITIONER

## 2020-02-07 PROCEDURE — 99213 OFFICE O/P EST LOW 20 MIN: CPT | Performed by: NURSE PRACTITIONER

## 2020-02-07 RX ORDER — METHYLPREDNISOLONE ACETATE 40 MG/ML
40 INJECTION, SUSPENSION INTRA-ARTICULAR; INTRALESIONAL; INTRAMUSCULAR; SOFT TISSUE ONCE
Status: COMPLETED | OUTPATIENT
Start: 2020-02-07 | End: 2020-02-07

## 2020-02-07 RX ORDER — ACYCLOVIR 200 MG/1
200 CAPSULE ORAL DAILY
COMMUNITY
End: 2020-02-07 | Stop reason: DRUGHIGH

## 2020-02-07 RX ORDER — DIAZEPAM 10 MG/1
TABLET ORAL
Qty: 10 TABLET | Refills: 0 | Status: SHIPPED | OUTPATIENT
Start: 2020-02-07

## 2020-02-07 RX ORDER — DIAZEPAM 10 MG/1
10 TABLET ORAL DAILY
COMMUNITY
End: 2020-02-07 | Stop reason: SDUPTHER

## 2020-02-07 RX ORDER — ACYCLOVIR 400 MG/1
800 TABLET ORAL DAILY
Qty: 60 TABLET | Refills: 5 | Status: SHIPPED | OUTPATIENT
Start: 2020-02-07

## 2020-02-07 RX ORDER — KETOROLAC TROMETHAMINE 30 MG/ML
60 INJECTION, SOLUTION INTRAMUSCULAR; INTRAVENOUS ONCE
Status: COMPLETED | OUTPATIENT
Start: 2020-02-07 | End: 2020-02-07

## 2020-02-07 RX ADMIN — KETOROLAC TROMETHAMINE 60 MG: 30 INJECTION, SOLUTION INTRAMUSCULAR; INTRAVENOUS at 16:04

## 2020-02-07 RX ADMIN — METHYLPREDNISOLONE ACETATE 40 MG: 40 INJECTION, SUSPENSION INTRA-ARTICULAR; INTRALESIONAL; INTRAMUSCULAR; SOFT TISSUE at 16:05

## 2020-02-07 RX ADMIN — METHYLPREDNISOLONE ACETATE 40 MG: 40 INJECTION, SUSPENSION INTRA-ARTICULAR; INTRALESIONAL; INTRAMUSCULAR; SOFT TISSUE at 16:06

## 2020-02-07 NOTE — PROGRESS NOTES
Chief Complaint   Patient presents with   • Facial Pain        Subjective   Ciro Shaw is a 59 y.o.  male who presents today for facial pain.    HPI:  FACIAL PAIN:  Patient has a history of trigeminal neuralgia.  It has been much worse lately.  He notes that even a light breeze causes severe pain of the right face/jawline.      Ciro Shaw  has no past medical history on file.    Allergies   Allergen Reactions   • Sulfa Antibiotics Unknown - Low Severity       Current Outpatient Medications:   •  acyclovir (ZOVIRAX) 400 MG tablet, Take 2 tablets by mouth Daily. Take no more than 5 doses a day., Disp: 60 tablet, Rfl: 5  •  diazePAM (VALIUM) 10 MG tablet, Take 1 BID x 5 days, Disp: 10 tablet, Rfl: 0  No current facility-administered medications for this visit.   History reviewed. No pertinent past medical history.  History reviewed. No pertinent surgical history.  Social History     Socioeconomic History   • Marital status:      Spouse name: Not on file   • Number of children: Not on file   • Years of education: Not on file   • Highest education level: Not on file   Tobacco Use   • Smoking status: Former Smoker     Packs/day: 2.00     Types: Cigarettes     Start date: 1970     Last attempt to quit: 2000     Years since quittin.1   • Smokeless tobacco: Never Used   Substance and Sexual Activity   • Alcohol use: Yes     Frequency: Monthly or less   • Drug use: Never   • Sexual activity: Defer     History reviewed. No pertinent family history.    Family history, surgical history, past medical history, Allergies and med's reviewed with patient today and updated in [facility] EMR.     ROS:  Review of Systems   Constitutional: Negative.  Negative for fatigue, fever and unexpected weight change.   HENT: Negative.  Negative for facial swelling, sore throat and trouble swallowing.    Eyes: Negative.  Negative for photophobia, discharge and visual disturbance.   Respiratory: Negative.  Negative for  "cough, chest tightness and shortness of breath.    Cardiovascular: Negative.  Negative for chest pain and palpitations.   Gastrointestinal: Negative.  Negative for abdominal pain, diarrhea, nausea and vomiting.   Endocrine: Negative.  Negative for polydipsia, polyphagia and polyuria.   Genitourinary: Negative.  Negative for dysuria, flank pain and frequency.   Musculoskeletal: Negative.  Negative for back pain, gait problem and neck pain.   Skin: Negative.  Negative for rash.   Allergic/Immunologic: Negative.    Neurological: Negative.  Negative for dizziness, light-headedness and headaches.        Right sided facial pain.   Hematological: Negative.    Psychiatric/Behavioral: Negative.  Negative for self-injury and suicidal ideas.       OBJECTIVE:  Vitals:    02/07/20 1513   BP: 154/86   BP Location: Left arm   Pulse: 60   Temp: 98.5 °F (36.9 °C)   TempSrc: Temporal   SpO2: 98%   Weight: 90.3 kg (199 lb)   Height: 185.4 cm (73\")     Physical Exam   Constitutional: He is oriented to person, place, and time. He appears well-developed and well-nourished. No distress.   HENT:   Head: Normocephalic and atraumatic.   Right Ear: External ear normal.   Left Ear: External ear normal.   Nose: Nose normal.   Mouth/Throat: Oropharynx is clear and moist.   Eyes: Pupils are equal, round, and reactive to light. Conjunctivae and EOM are normal.   Neck: Normal range of motion. Neck supple.   Cardiovascular: Normal rate, regular rhythm, normal heart sounds and intact distal pulses.   No murmur heard.  Pulmonary/Chest: Effort normal and breath sounds normal. No respiratory distress.   Abdominal: Soft. Bowel sounds are normal. He exhibits no distension. There is no tenderness.   Musculoskeletal: Normal range of motion. He exhibits no edema.   Neurological: He is alert and oriented to person, place, and time.   Tenderness/pain to light touch of the right cheek/jawline.   Skin: Skin is warm and dry. Capillary refill takes less than 2 " seconds. He is not diaphoretic. No erythema.   Psychiatric: He has a normal mood and affect. His behavior is normal. Judgment and thought content normal.   Nursing note and vitals reviewed.      ASSESSMENT/ PLAN:    Ciro was seen today for facial pain.    Diagnoses and all orders for this visit:    Trigeminal neuralgia of right side of face  -     acyclovir (ZOVIRAX) 400 MG tablet; Take 2 tablets by mouth Daily. Take no more than 5 doses a day.  -     diazePAM (VALIUM) 10 MG tablet; Take 1 BID x 5 days  -     ketorolac (TORADOL) injection 60 mg  -     methylPREDNISolone acetate (DEPO-medrol) injection 40 mg  -     methylPREDNISolone acetate (DEPO-medrol) injection 40 mg    Facial pain    Situational hypertension    Patient instructed to monitor his blood pressure.  Felt to be elevated today secondary to facial pain.  5 day course of Valium is prescribed today for specific medical treatment, not anxiety.  No long term script to be authorized; therefore, no CSA or UDS was obtained today.    Orders Placed Today:     New Medications Ordered This Visit   Medications   • acyclovir (ZOVIRAX) 400 MG tablet     Sig: Take 2 tablets by mouth Daily. Take no more than 5 doses a day.     Dispense:  60 tablet     Refill:  5   • diazePAM (VALIUM) 10 MG tablet     Sig: Take 1 BID x 5 days     Dispense:  10 tablet     Refill:  0   • ketorolac (TORADOL) injection 60 mg   • methylPREDNISolone acetate (DEPO-medrol) injection 40 mg   • methylPREDNISolone acetate (DEPO-medrol) injection 40 mg        Management Plan:     An After Visit Summary was printed and given to the patient at discharge.    Follow-up: Return if symptoms worsen or fail to improve.    Adore Ornelas, APRN 2/7/2020 4:32 PM  This note was electronically signed.

## 2024-09-09 ENCOUNTER — OFFICE VISIT (OUTPATIENT)
Dept: FAMILY MEDICINE CLINIC | Facility: CLINIC | Age: 64
End: 2024-09-09
Payer: MEDICARE

## 2024-09-09 DIAGNOSIS — E66.3 OVERWEIGHT (BMI 25.0-29.9): ICD-10-CM

## 2024-09-09 DIAGNOSIS — Z13.29 THYROID DISORDER SCREENING: ICD-10-CM

## 2024-09-09 DIAGNOSIS — I10 ESSENTIAL HYPERTENSION: ICD-10-CM

## 2024-09-09 DIAGNOSIS — Z12.5 SCREENING FOR PROSTATE CANCER: ICD-10-CM

## 2024-09-09 DIAGNOSIS — G89.29 CHRONIC RIGHT HIP PAIN: ICD-10-CM

## 2024-09-09 DIAGNOSIS — G89.29 CHRONIC RIGHT SI JOINT PAIN: ICD-10-CM

## 2024-09-09 DIAGNOSIS — G89.29 CHRONIC LEFT SHOULDER PAIN: ICD-10-CM

## 2024-09-09 DIAGNOSIS — M25.551 CHRONIC RIGHT HIP PAIN: ICD-10-CM

## 2024-09-09 DIAGNOSIS — M25.512 CHRONIC LEFT SHOULDER PAIN: ICD-10-CM

## 2024-09-09 DIAGNOSIS — Z00.00 WELCOME TO MEDICARE PREVENTIVE VISIT: Primary | ICD-10-CM

## 2024-09-09 DIAGNOSIS — Z12.11 COLON CANCER SCREENING: ICD-10-CM

## 2024-09-09 DIAGNOSIS — M25.612 DECREASED SHOULDER MOBILITY, LEFT: ICD-10-CM

## 2024-09-09 DIAGNOSIS — G50.0 TRIGEMINAL NEURALGIA OF RIGHT SIDE OF FACE: ICD-10-CM

## 2024-09-09 DIAGNOSIS — Z13.220 LIPID SCREENING: ICD-10-CM

## 2024-09-09 DIAGNOSIS — M53.3 CHRONIC RIGHT SI JOINT PAIN: ICD-10-CM

## 2024-09-09 RX ORDER — TRIAMCINOLONE ACETONIDE 40 MG/ML
80 INJECTION, SUSPENSION INTRA-ARTICULAR; INTRAMUSCULAR ONCE
Status: COMPLETED | OUTPATIENT
Start: 2024-09-09 | End: 2024-09-09

## 2024-09-09 RX ADMIN — TRIAMCINOLONE ACETONIDE 80 MG: 40 INJECTION, SUSPENSION INTRA-ARTICULAR; INTRAMUSCULAR at 08:56

## 2024-09-09 NOTE — PROGRESS NOTES
Subjective   The ABCs of the Annual Wellness Visit  Medicare Wellness Visit      Ciro Shaw is a 64 y.o. patient who presents for a Medicare Wellness Visit.    The following portions of the patient's history were reviewed and   updated as appropriate: allergies, current medications, past family history, past medical history, past social history, past surgical history, and problem list.    Compared to one year ago, the patient's physical   health is the same.  Compared to one year ago, the patient's mental   health is the same.    Recent Hospitalizations:  He was not admitted to the hospital during the last year.     Current Medical Providers:  Patient Care Team:  Adore Ornelas APRN as PCP - General (Family Medicine)    Outpatient Medications Prior to Visit   Medication Sig Dispense Refill    acyclovir (ZOVIRAX) 400 MG tablet Take 2 tablets by mouth Daily. Take no more than 5 doses a day. 60 tablet 5    diazePAM (VALIUM) 10 MG tablet Take 1 BID x 5 days (Patient not taking: Reported on 9/9/2024) 10 tablet 0     No facility-administered medications prior to visit.     No opioid medication identified on active medication list. I have reviewed chart for other potential  high risk medication/s and harmful drug interactions in the elderly.      Aspirin is not on active medication list.  Aspirin use is not indicated based on review of current medical condition/s. Risk of harm outweighs potential benefits.  .    Patient Active Problem List   Diagnosis    Trigeminal neuralgia    Essential hypertension     Advance Care Planning Advance Directive is not on file.  ACP discussion was declined by the patient. Patient has an advance directive (not in EMR), copy requested.      Patient declines EKG screening and vision screening.      Objective   Vitals:    09/09/24 0815 09/09/24 0844   BP: 160/98 144/84   BP Location: Left arm Left arm   Patient Position: Sitting Sitting   Cuff Size: Large Adult Adult   Pulse: 81   "  Temp: 97.8 °F (36.6 °C)    SpO2: 97%    Weight: 87.5 kg (193 lb)    Height: 186.7 cm (73.5\")        Estimated body mass index is 25.12 kg/m² as calculated from the following:    Height as of this encounter: 186.7 cm (73.5\").    Weight as of this encounter: 87.5 kg (193 lb).    BMI is >= 25 and <30. (Overweight) The following options were offered after discussion;: exercise counseling/recommendations and nutrition counseling/recommendations       Does the patient have evidence of cognitive impairment? No  Lab Results   Component Value Date    CHLPL 148 2024    TRIG 152 (H) 2024    HDL 51 2024    LDL 71 2024    VLDL 26 2024                                                                                               Health  Risk Assessment    Smoking Status:  Social History     Tobacco Use   Smoking Status Former    Current packs/day: 0.00    Average packs/day: 2.0 packs/day for 30.0 years (60.0 ttl pk-yrs)    Types: Cigarettes    Start date: 1970    Quit date: 2000    Years since quittin.7   Smokeless Tobacco Never     Alcohol Consumption:  Social History     Substance and Sexual Activity   Alcohol Use Yes       Fall Risk Screen  STEADI Fall Risk Assessment was completed, and patient is at LOW risk for falls.Assessment completed on:2024    Depression Screenin/9/2024     8:15 AM   PHQ-2/PHQ-9 Depression Screening   Little Interest or Pleasure in Doing Things 0-->not at all   Feeling Down, Depressed or Hopeless 0-->not at all   PHQ-9: Brief Depression Severity Measure Score 0     Health Habits and Functional and Cognitive Screenin/9/2024     8:15 AM   Functional & Cognitive Status   Do you have difficulty preparing food and eating? No   Do you have difficulty bathing yourself, getting dressed or grooming yourself? No   Do you have difficulty using the toilet? No   Do you have difficulty moving around from place to place? No   Do you have trouble with " steps or getting out of a bed or a chair? No   Current Diet Well Balanced Diet   Dental Exam Up to date   Eye Exam Up to date   Exercise (times per week) 5 times per week   Current Exercises Include Walking   Do you need help using the phone?  No   Are you deaf or do you have serious difficulty hearing?  No   Do you need help to go to places out of walking distance? No   Do you need help shopping? No   Do you need help preparing meals?  No   Do you need help with housework?  No   Do you need help with laundry? No   Do you need help taking your medications? No   Do you need help managing money? No   Do you ever drive or ride in a car without wearing a seat belt? No   Have you felt unusual stress, anger or loneliness in the last month? No   Who do you live with? Spouse   If you need help, do you have trouble finding someone available to you? No   Have you been bothered in the last four weeks by sexual problems? No   Do you have difficulty concentrating, remembering or making decisions? No           Age-appropriate Screening Schedule:  Refer to the list below for future screening recommendations based on patient's age, sex and/or medical conditions. Orders for these recommended tests are listed in the plan section. The patient has been provided with a written plan.    Health Maintenance List  Health Maintenance   Topic Date Due    BMI FOLLOWUP  Never done    COLORECTAL CANCER SCREENING  Never done    TDAP/TD VACCINES (1 - Tdap) Never done    ZOSTER VACCINE (1 of 2) Never done    HEPATITIS C SCREENING  Never done    Pneumococcal Vaccine 65+ (1 of 1 - PCV) Never done    AAA SCREEN (ONE-TIME)  Never done    INFLUENZA VACCINE  08/01/2024    COVID-19 Vaccine (5 - 2023-24 season) 11/29/2024 (Originally 9/1/2024)    ANNUAL WELLNESS VISIT  09/09/2025     Physical Exam  Vitals and nursing note reviewed.   Constitutional:       General: He is not in acute distress.     Appearance: Normal appearance. He is not ill-appearing.    HENT:      Head: Normocephalic and atraumatic.   Neck:      Vascular: No carotid bruit.   Cardiovascular:      Rate and Rhythm: Normal rate and regular rhythm.      Pulses: Normal pulses.      Heart sounds: Normal heart sounds. No murmur heard.  Pulmonary:      Effort: Pulmonary effort is normal.      Breath sounds: Normal breath sounds.   Abdominal:      General: Bowel sounds are normal.      Palpations: Abdomen is soft.   Musculoskeletal:      Cervical back: Neck supple.      Right lower leg: No edema.      Left lower leg: No edema.      Comments: Diminished left shoulder AROM, abduction, due to pain. Point tenderness right SI joint.   Skin:     General: Skin is warm and dry.   Neurological:      Mental Status: He is alert and oriented to person, place, and time.                                                                                                                                                   CMS Preventative Services Quick Reference  Risk Factors Identified During Encounter  Immunizations Discussed/Encouraged: Tdap, Prevnar 20 (Pneumococcal 20-valent conjugate), Shingrix, COVID19, and RSV (Respiratory Syncytial Virus)    The above risks/problems have been discussed with the patient.  Pertinent information has been shared with the patient in the After Visit Summary.  An After Visit Summary and PPPS were made available to the patient.    Follow Up:   Next Medicare Wellness visit to be scheduled in 1 year.     Assessment & Plan  Welcome to Medicare preventive visit    Colon cancer screening    Trigeminal neuralgia of right side of face    Screening for prostate cancer    Lipid screening    Thyroid disorder screening    Essential hypertension  Patient wishes to monitor; declines treatment.  Chronic left shoulder pain    Decreased shoulder mobility, left    Chronic right hip pain    Chronic right SI joint pain    Overweight (BMI 25.0-29.9)  Patient's (Body mass index is 25.12 kg/m².) indicates that  they are overweight with health conditions that include hypertension . Weight is newly identified. BMI is above average; BMI management plan is completed. We discussed portion control and increasing exercise.     Orders Placed This Encounter   Procedures    MRI Shoulder Left Without Contrast    Cologuard - Stool, Per Rectum    Comprehensive Metabolic Panel    Lipid Panel With LDL / HDL Ratio    T4    TSH    PSA Screen    CBC & Differential     New Medications Ordered This Visit   Medications    triamcinolone acetonide (KENALOG-40) injection 80 mg   Further recommendations will be based upon MRI/imaging results.  Patient encouraged to partake of healthy diet rich in fresh fruits and vegetables as well as lean proteins.  Patient encouraged to participate in daily exercise with goal of 30 min sustained activity.       Follow Up:   Return in about 4 weeks (around 10/7/2024) for Next scheduled follow up.

## 2024-09-10 VITALS
WEIGHT: 193 LBS | OXYGEN SATURATION: 97 % | SYSTOLIC BLOOD PRESSURE: 144 MMHG | DIASTOLIC BLOOD PRESSURE: 84 MMHG | TEMPERATURE: 97.8 F | HEIGHT: 74 IN | BODY MASS INDEX: 24.77 KG/M2 | HEART RATE: 81 BPM

## 2024-09-10 DIAGNOSIS — G50.0 TRIGEMINAL NEURALGIA OF RIGHT SIDE OF FACE: Primary | ICD-10-CM

## 2024-09-10 DIAGNOSIS — Q28.2 AVM (ARTERIOVENOUS MALFORMATION) BRAIN: ICD-10-CM

## 2024-09-10 PROBLEM — M25.612 DECREASED SHOULDER MOBILITY, LEFT: Status: ACTIVE | Noted: 2024-09-10

## 2024-09-10 PROBLEM — G89.29 CHRONIC LEFT SHOULDER PAIN: Status: ACTIVE | Noted: 2024-09-10

## 2024-09-10 PROBLEM — M25.512 CHRONIC LEFT SHOULDER PAIN: Status: ACTIVE | Noted: 2024-09-10

## 2024-09-10 LAB
ALBUMIN SERPL-MCNC: 4.3 G/DL (ref 3.9–4.9)
ALP SERPL-CCNC: 89 IU/L (ref 44–121)
ALT SERPL-CCNC: 18 IU/L (ref 0–44)
AST SERPL-CCNC: 22 IU/L (ref 0–40)
BASOPHILS # BLD AUTO: 0.1 X10E3/UL (ref 0–0.2)
BASOPHILS NFR BLD AUTO: 1 %
BILIRUB SERPL-MCNC: 0.2 MG/DL (ref 0–1.2)
BUN SERPL-MCNC: 18 MG/DL (ref 8–27)
BUN/CREAT SERPL: 18 (ref 10–24)
CALCIUM SERPL-MCNC: 9.3 MG/DL (ref 8.6–10.2)
CHLORIDE SERPL-SCNC: 105 MMOL/L (ref 96–106)
CHOLEST SERPL-MCNC: 148 MG/DL (ref 100–199)
CO2 SERPL-SCNC: 26 MMOL/L (ref 20–29)
CREAT SERPL-MCNC: 1.02 MG/DL (ref 0.76–1.27)
EGFRCR SERPLBLD CKD-EPI 2021: 81 ML/MIN/1.73
EOSINOPHIL # BLD AUTO: 0.2 X10E3/UL (ref 0–0.4)
EOSINOPHIL NFR BLD AUTO: 2 %
ERYTHROCYTE [DISTWIDTH] IN BLOOD BY AUTOMATED COUNT: 13.6 % (ref 11.6–15.4)
GLOBULIN SER CALC-MCNC: 2.5 G/DL (ref 1.5–4.5)
GLUCOSE SERPL-MCNC: 78 MG/DL (ref 70–99)
HCT VFR BLD AUTO: 44.1 % (ref 37.5–51)
HDLC SERPL-MCNC: 51 MG/DL
HGB BLD-MCNC: 14.7 G/DL (ref 13–17.7)
IMM GRANULOCYTES # BLD AUTO: 0 X10E3/UL (ref 0–0.1)
IMM GRANULOCYTES NFR BLD AUTO: 0 %
LDLC SERPL CALC-MCNC: 71 MG/DL (ref 0–99)
LDLC/HDLC SERPL: 1.4 RATIO (ref 0–3.6)
LYMPHOCYTES # BLD AUTO: 1.9 X10E3/UL (ref 0.7–3.1)
LYMPHOCYTES NFR BLD AUTO: 27 %
MCH RBC QN AUTO: 31.7 PG (ref 26.6–33)
MCHC RBC AUTO-ENTMCNC: 33.3 G/DL (ref 31.5–35.7)
MCV RBC AUTO: 95 FL (ref 79–97)
MONOCYTES # BLD AUTO: 0.7 X10E3/UL (ref 0.1–0.9)
MONOCYTES NFR BLD AUTO: 11 %
NEUTROPHILS # BLD AUTO: 4.1 X10E3/UL (ref 1.4–7)
NEUTROPHILS NFR BLD AUTO: 59 %
PLATELET # BLD AUTO: 241 X10E3/UL (ref 150–450)
POTASSIUM SERPL-SCNC: 4 MMOL/L (ref 3.5–5.2)
PROT SERPL-MCNC: 6.8 G/DL (ref 6–8.5)
PSA SERPL-MCNC: 0.8 NG/ML (ref 0–4)
RBC # BLD AUTO: 4.64 X10E6/UL (ref 4.14–5.8)
SODIUM SERPL-SCNC: 143 MMOL/L (ref 134–144)
T4 SERPL-MCNC: 7.1 UG/DL (ref 4.5–12)
TRIGL SERPL-MCNC: 152 MG/DL (ref 0–149)
TSH SERPL DL<=0.005 MIU/L-ACNC: 1.05 UIU/ML (ref 0.45–4.5)
VLDLC SERPL CALC-MCNC: 26 MG/DL (ref 5–40)
WBC # BLD AUTO: 7 X10E3/UL (ref 3.4–10.8)

## 2024-10-14 ENCOUNTER — HOSPITAL ENCOUNTER (OUTPATIENT)
Dept: MRI IMAGING | Facility: HOSPITAL | Age: 64
Discharge: HOME | End: 2024-10-14
Payer: MEDICARE

## 2024-10-14 ENCOUNTER — OFFICE VISIT (OUTPATIENT)
Dept: NEUROSURGERY | Facility: CLINIC | Age: 64
End: 2024-10-14
Payer: MEDICARE

## 2024-10-14 VITALS — WEIGHT: 186 LBS | HEIGHT: 74 IN | BODY MASS INDEX: 23.87 KG/M2

## 2024-10-14 DIAGNOSIS — G89.29 CHRONIC LEFT SHOULDER PAIN: ICD-10-CM

## 2024-10-14 DIAGNOSIS — Q28.2 AVM (ARTERIOVENOUS MALFORMATION) BRAIN: ICD-10-CM

## 2024-10-14 DIAGNOSIS — M25.512 CHRONIC LEFT SHOULDER PAIN: ICD-10-CM

## 2024-10-14 DIAGNOSIS — G50.0 TRIGEMINAL NEURALGIA: Primary | ICD-10-CM

## 2024-10-14 DIAGNOSIS — M25.612 DECREASED SHOULDER MOBILITY, LEFT: ICD-10-CM

## 2024-10-14 DIAGNOSIS — G50.0 TRIGEMINAL NEURALGIA OF RIGHT SIDE OF FACE: ICD-10-CM

## 2024-10-14 DIAGNOSIS — R93.89 ABNORMAL MRI: ICD-10-CM

## 2024-10-14 DIAGNOSIS — G89.29 CHRONIC LEFT SHOULDER PAIN: Primary | ICD-10-CM

## 2024-10-14 DIAGNOSIS — M25.512 CHRONIC LEFT SHOULDER PAIN: Primary | ICD-10-CM

## 2024-10-14 PROCEDURE — 0 GADOBENATE DIMEGLUMINE 529 MG/ML SOLUTION: Performed by: NURSE PRACTITIONER

## 2024-10-14 PROCEDURE — A9577 INJ MULTIHANCE: HCPCS | Performed by: NURSE PRACTITIONER

## 2024-10-14 PROCEDURE — 99204 OFFICE O/P NEW MOD 45 MIN: CPT | Performed by: NEUROLOGICAL SURGERY

## 2024-10-14 PROCEDURE — 73221 MRI JOINT UPR EXTREM W/O DYE: CPT

## 2024-10-14 PROCEDURE — 1160F RVW MEDS BY RX/DR IN RCRD: CPT | Performed by: NEUROLOGICAL SURGERY

## 2024-10-14 PROCEDURE — 70553 MRI BRAIN STEM W/O & W/DYE: CPT

## 2024-10-14 PROCEDURE — 1159F MED LIST DOCD IN RCRD: CPT | Performed by: NEUROLOGICAL SURGERY

## 2024-10-14 RX ORDER — OXCARBAZEPINE 150 MG/1
TABLET, FILM COATED ORAL
Qty: 140 TABLET | Refills: 0 | Status: SHIPPED | OUTPATIENT
Start: 2024-10-14 | End: 2024-11-11

## 2024-10-14 RX ADMIN — GADOBENATE DIMEGLUMINE 18 ML: 529 INJECTION, SOLUTION INTRAVENOUS at 11:37

## 2024-10-14 NOTE — PROGRESS NOTES
Primary Care Provider: Adore Ornelas APRN    Chief Complaint:   Chief Complaint   Patient presents with    Trigeminal Neuralgia     New patient here for evaluation.       History of Present Illness  Ciro Shaw is a 64 y.o. male.     History of Present Illness  The patient presents for evaluation of trigeminal neuralgia. He is accompanied by his wife.    He was diagnosed with trigeminal neuralgia 20 years ago by Dr. Woodruff at [place]. The condition causes pain that can originate from various points on the right side of his skull, including under his tongue, in his eyeball, or in his eyebrow. This pain can sometimes interfere with his speech. He describes the pain as electrical in nature. Certain activities, such as shaving, can trigger the pain.    He was previously prescribed Tegretol, but it had adverse effects, causing a foggy sensation in his head. He has not been diagnosed with Multiple Sclerosis (MS). His episodes of pain can be infrequent, with years passing between episodes, but when they occur, they can last from a week to several months. During these periods, he can experience 12 to 15 episodes of pain per day, each lasting from seconds to a minute. He has not undergone any surgery for this condition.    Additionally, he uses hearing aids.    SOCIAL HISTORY  He is an .    FAMILY HISTORY  He denies any family history of trigeminal neuralgia.         Review of Systems   Constitutional: Negative.    HENT: Negative.     Eyes: Negative.    Respiratory: Negative.     Cardiovascular: Negative.    Gastrointestinal: Negative.    Endocrine: Negative.    Genitourinary: Negative.    Musculoskeletal: Negative.    Skin: Negative.    Allergic/Immunologic: Negative.    Neurological: Negative.    Hematological: Negative.    Psychiatric/Behavioral: Negative.         Past Medical History:   Diagnosis Date    Trigeminal neuralgia        History reviewed. No pertinent surgical history.    Family History:  family history includes COPD in his mother; Hearing loss in his father.    Social History:  reports that he quit smoking about 24 years ago. His smoking use included cigarettes. He started smoking about 54 years ago. He has a 60 pack-year smoking history. He has never used smokeless tobacco. He reports current alcohol use. He reports that he does not use drugs.    Medications:    Current Outpatient Medications:     acyclovir (ZOVIRAX) 400 MG tablet, Take 2 tablets by mouth Daily. Take no more than 5 doses a day., Disp: 60 tablet, Rfl: 5    OXcarbazepine (TRILEPTAL) 150 MG tablet, Take 1 tablet by mouth 2 (Two) Times a Day for 7 days, THEN 2 tablets 2 (Two) Times a Day for 7 days, THEN 3 tablets 2 (Two) Times a Day for 7 days, THEN 4 tablets 2 (Two) Times a Day for 7 days., Disp: 140 tablet, Rfl: 0  No current facility-administered medications for this visit.    Allergies:  Sulfa antibiotics    Objective   Physical Exam  Eyes:      General: Lids are normal.      Extraocular Movements: Extraocular movements intact.      Pupils: Pupils are equal, round, and reactive to light.   Neurological:      Mental Status: He is alert.      Coordination: Coordination is intact.      Deep Tendon Reflexes:      Reflex Scores:       Tricep reflexes are 2+ on the right side and 2+ on the left side.       Bicep reflexes are 2+ on the right side and 2+ on the left side.       Brachioradialis reflexes are 2+ on the right side and 2+ on the left side.       Patellar reflexes are 2+ on the right side and 2+ on the left side.       Achilles reflexes are 2+ on the right side and 2+ on the left side.  Psychiatric:         Speech: Speech normal.       Neurological Exam  Mental Status  Alert. Speech is normal. Language is fluent with no aphasia. Attention and concentration are normal.    Cranial Nerves  CN I: Sense of smell is normal.  CN II: Visual acuity is normal.  CN III, IV, VI: Extraocular movements intact bilaterally. Normal lids and  orbits bilaterally. Pupils equal round and reactive to light bilaterally.  CN V: Facial sensation is normal.  CN VII: Full and symmetric facial movement.  CN IX, X: Palate elevates symmetrically  CN XI: Shoulder shrug strength is normal.  CN XII: Tongue midline without atrophy or fasciculations.    Motor  Normal muscle bulk throughout. Normal muscle tone.                                               Right                     Left  Toe extension                        5                          5                                             Right                     Left  Deltoid                                   5                          5   Biceps                                   5                          5   Triceps                                  5                          5   Wrist extensor                       5                          5   Iliopsoas                               5                          5   Quadriceps                           5                          5   Anterior tibialis                      5                          5   Posterior tibialis                    5                          5    Sensory  Sensation is intact to light touch, pinprick, vibration and proprioception in all four extremities.    Reflexes                                            Right                      Left  Brachioradialis                    2+                         2+  Biceps                                 2+                         2+  Triceps                                2+                         2+  Patellar                                2+                         2+  Achilles                                2+                         2+  Right Plantar: downgoing  Left Plantar: downgoing    Right pathological reflexes: Brandon's absent. Ankle clonus absent.  Left pathological reflexes: Brandon's absent. Ankle clonus absent.    Coordination    Finger-to-nose, rapid alternating movements and  heel-to-shin normal bilaterally without dysmetria.    Gait  Casual gait is normal including stance, stride, and arm swing.        Imaging: (independent review and interpretation)  MRI Brain With & Without Contrast    Result Date: 10/14/2024   1.  History of right-sided facial pain. There is a right SCA loop situated adjacent to the cisternal segment of the right trigeminal nerve near the root entry zone which could potentially be a cause for symptomatic neurovascular compression. Additionally, the right trigeminal nerve appears to be smaller in caliber (atrophic) relative to the left-sided trigeminal nerve.  This report was signed and finalized on 10/14/2024 1:45 PM by Dr Louis York.      MRI Shoulder Left Without Contrast    Result Date: 10/14/2024   1.  Low-grade partial-thickness intrasubstance insertional tear of infraspinatus anteriorly at the footplate. Tendinosis of infraspinatus distally is also noted.  2.  Questionable tiny type II SLAP tear.    3.  Moderate AC joint acropathy with type II acromion and hypertrophic spurring on both sides of the AC joint as well as some reactive marrow edema-like signal.  This report was signed and finalized on 10/14/2024 12:32 PM by Dr. Maverick Flowers MD.         CT Angiogram Head  Order: [id number]  Impression    1. 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 This is nonenhancing on the angiogram and I suspect that  it is thrombosed.  Signed by Dr Louis York on 9/6/2018 3:01 PM    10/14/2024 -noncontrast MRI of the brain personally reviewed.  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  No evidence of mass lesions.  No evidence of MS lesions.        ASSESSMENT and PLAN  Ciro Shaw is a 64 y.o. male with a significant comorbidity of hypertension. He presents with a new problem of  trigeminal neuralgia it has been present for 20 years having previously failed carbamazepine. Physical exam findings of neurologically intact.  His imaging, including MRI of the brain, shows superior cerebellar artery effacing the right trigeminal nerve without evidence of mass lesions or MS.    Right trigeminal neuralgia  Ciro presents with signs and symptoms consistent with trigeminal neuralgia.  Differential diagnosis includes trigeminal neuralgia vs atypical facial pain.      Treatment options: Today we discussed the risk benefits and possible complications of management for trigeminal neuralgia. 1) medical management, 2) radiofrequency lesioning, 3) gamma knife to the dorsal root entry zone, 4) microvascular decompression.    Ciro has elected to proceed with a trial of oxcarbazepine prior to considering surgical intervention.  We discussed the risk and benefit of this medication including possible blood dyscrasias and alterations to sodium.  Patient knows to continue the medication ramp until he has cessation of symptoms or side effects become intolerable.  Side effects including dizziness and difficulty with concentration were discussed with the patient.  He was advised to begin with a nighttime only dose.  We will obtain CBC and CMP now and monthly for 3 months.    Oxcarbazepine is a keto-analogue of carbamazepine that is rapidly converted into its pharmacologically active 10-monohydroxy metabolite. The keto derivative of carbamazepine does not pass through the liver cytochrome system, resulting in an improved side effect profile and fewer drug interactions than with carbamazepine.28-30 Oxcarbazepine is an acceptable alternative to carbamazepine, which may have provided pain relief but has caused unacceptable adverse effects. Better tolerability can also be considered an advantage over carbamazepine.31 A systematic review from the American Academy of Neurology /  Federation of Neurological  Societies (AAN/EFNS)11 identified several randomized controlled trials that compare oxcarbazepine with carbamazepine in patients with classic TN. Oxcarbazepine can be started at 150 mg twice daily. The dose can be increased as tolerated in 300 mg increments every third day until pain relief occurs. Maintenance doses range between 300-600 mg twice daily.26 The maximum suggested total dose is 1800 mg/d. The risk of allergic cross reactivity between carbamazepine and oxcarbazepine is around 25%, so oxcarbazepine is best avoided when carbamazepine allergy is evident.9    Oxcarbazepine Ramp   Morning Night   Week 1 150 mg 150 mg   Week 2 300 mg 300 mg   Week 3 450 mg 450 mg   Week 4 600 mg 600 mg     Ciro and ANA discussed right microvascular decompression of the trigeminal nerve.  Procedure is indicated and patients with an adequate medical control o with greater than 5 years of anticipated survival unable to tolerate the craniotomy.  F the relief is often long-lived, with an initial success rate of 85 to 98% and 70% pain control after 10 years.  Also there is only a 2% chance of facial anesthesia.  Risks: Mortality is less than 1%, aseptic meningitis 20%, 1 to 10% major neurological morbidity, failure rate 20 to 25%, and decreased hearing in the ipsilateral ear.  We will see him back in January.  If at this point he wants to proceed with surgery we will proceed with a right microvascular decompression.        Diagnoses and all orders for this visit:    1. Trigeminal neuralgia (Primary)  -     OXcarbazepine (TRILEPTAL) 150 MG tablet; Take 1 tablet by mouth 2 (Two) Times a Day for 7 days, THEN 2 tablets 2 (Two) Times a Day for 7 days, THEN 3 tablets 2 (Two) Times a Day for 7 days, THEN 4 tablets 2 (Two) Times a Day for 7 days.  Dispense: 140 tablet; Refill: 0        Return in about 3 months (around 1/14/2025) for FOLLOW UP-DR MOJICA (30MIN).    Thank you for this Consultation and the opportunity to participate in  Ciro's care.    Sincerely,  Dae Shelton MD    I spent 33 minutes caring for Ciro on this date of service. This time includes time spent by me in the following activities: preparing for the visit, reviewing tests, obtaining and/or reviewing a separately obtained history, performing a medically appropriate examination and/or evaluation, counseling and educating the patient/family/caregiver, ordering medications, tests, or procedures, referring and communicating with other health care professionals, documenting information in the medical record, independently interpreting results and communicating that information with the patient/family/caregiver, and/or care coordination.     Medical Decision Making (2/3)  Problem Points (2,3,4 or more)  Undiagnosed new problem (Mod)  Data Points (2,3,4 or more)  CATEGORY 1  INDEPENDENT INTERPRETATION Imaging = 1  CATEGORY 2  Independent interpretation of test performed by another physician/NPP (Cat 2)  CATEGORY 3  Risk (Low, Mod, High)  RX: Prescription Rx management (Moderate)    E/M = MDM 2 out of 3   or   TIME  [place] 4 - [address]1994 = Mod + (Cat1=3pts OR Cat2 OR Cat 3) + Mod Risk   or   30-39 minutes

## 2024-10-14 NOTE — PATIENT INSTRUCTIONS
Right trigeminal neuralgia  Ciro presents with signs and symptoms consistent with trigeminal neuralgia.  Differential diagnosis includes trigeminal neuralgia vs atypical facial pain.      Treatment options: Today we discussed the risk benefits and possible complications of management for trigeminal neuralgia. 1) medical management, 2) radiofrequency lesioning, 3) gamma knife to the dorsal root entry zone, 4) microvascular decompression.    He has been placed on Tegretol and is unable to tolerate this medication to an appropriate level of 900 to 1200 mg/day due to daytime sleepiness.  Therefore we discussed radiofrequency lesioning versus stereotactic radiosurgery with gamma knife versus microvascular decompression.      Ciro has elected to proceed with a trial of oxcarbazepine prior to considering surgical intervention.  We discussed the risk and benefit of this medication including possible blood dyscrasias and alterations to sodium.  Patient knows to continue the medication ramp until he has cessation of symptoms or side effects become intolerable.  Side effects including dizziness and difficulty with concentration were discussed with the patient.  He was advised to begin with a nighttime only dose.  We will obtain CBC and CMP now and monthly for 3 months.    Oxcarbazepine is a keto-analogue of carbamazepine that is rapidly converted into its pharmacologically active 10-monohydroxy metabolite. The keto derivative of carbamazepine does not pass through the liver cytochrome system, resulting in an improved side effect profile and fewer drug interactions than with carbamazepine.28-30 Oxcarbazepine is an acceptable alternative to carbamazepine, which may have provided pain relief but has caused unacceptable adverse effects. Better tolerability can also be considered an advantage over carbamazepine.31 A systematic review from the American Academy of Neurology /  Federation of Neurological Societies  (AAN/EFNS)11 identified several randomized controlled trials that compare oxcarbazepine with carbamazepine in patients with classic TN. Oxcarbazepine can be started at 150 mg twice daily. The dose can be increased as tolerated in 300 mg increments every third day until pain relief occurs. Maintenance doses range between 300-600 mg twice daily.26 The maximum suggested total dose is 1800 mg/d. The risk of allergic cross reactivity between carbamazepine and oxcarbazepine is around 25%, so oxcarbazepine is best avoided when carbamazepine allergy is evident.9    Oxcarbazepine Ramp   Morning Night   Week 1 150 mg 150 mg   Week 2 300 mg 300 mg   Week 3 450 mg 450 mg   Week 4 600 mg 600 mg     Ciro has NOT elected to proceed with radiofrequency lesioning of the trigeminal nerve.  Often times pain relief can be 6 months to 2 years at which point re-ablation is possible.  Stereotactic radiosurgery has similar efficacy but with a delay of onset from 6 months to 1 year.  Given these factors she is elected to proceed with radiofrequency lesioning.  She understands that there is a significant risk of anesthesia dolorosa. Ciro understands that 93% report excellent or good results after percutaneous stereotactic rhizotomy but there is a 15% chance of recurrence within 5 years and 25% chance of recurrence of than 15 years. We reviewed the surgery itself as well as risks including damage to the carotid artery, dysesthesia 20%, anesthesia dolorosa 1%, absent corneal reflex 6%, corneal keratitis 2%, masseter weakness 16%, diplopia 1.2%,    Ciro HAS elected to proceed with microvascular decompression of the right trigeminal nerve.  Procedure is indicated and patients with an adequate medical control o with greater than 5 years of anticipated survival unable to tolerate the craniotomy.  IF the relief is often long-lived, with an initial success rate of 85 to 98% and 70% pain control after 10 years.  Also there is only a 2%  chance of facial anesthesia.  Risks: Mortality is less than 1%, aseptic meningitis 20%, 1 to 10% major neurological morbidity, failure rate 20 to 25%, and decreased hearing in the ipsilateral ear.

## 2024-10-16 ENCOUNTER — TELEPHONE (OUTPATIENT)
Dept: FAMILY MEDICINE CLINIC | Facility: CLINIC | Age: 64
End: 2024-10-16

## 2024-10-16 NOTE — TELEPHONE ENCOUNTER
Caller: Ciro Shaw    Relationship: Self    Best call back number: [phone]     What is the best time to reach you: NO CALL BACK NEEDED UNLESS THEY HAVE QUESTIONS     Who are you requesting to speak with (clinical staff, provider,  specific staff member): CLINICAL     Do you know the name of the person who called:     What was the call regarding: PATIENT WAS CALLING TO APOLOGIZE TO SAM FOR ASKING WHICH ORTHO SHE RECOMMENDED, AND ALSO TO LET HER KNOW THAT HE WOULD LIKE TO HAVE DR TORRES WITH [facility] DO THE SURGERY FOR HIS ROTATOR CUFF ON HIS LEFT SHOULDER.     Is it okay if the provider responds through MyChart:

## 2024-10-17 ENCOUNTER — PATIENT ROUNDING (BHMG ONLY) (OUTPATIENT)
Dept: NEUROSURGERY | Facility: CLINIC | Age: 64
End: 2024-10-17
Payer: MEDICARE

## 2024-10-17 NOTE — PROGRESS NOTES
A My-Chart message has been sent to the patient for PATIENT ROUNDING with [facility].

## 2024-10-25 ENCOUNTER — TELEPHONE (OUTPATIENT)
Age: 64
End: 2024-10-25

## 2024-11-06 DIAGNOSIS — M25.512 PAIN IN JOINT OF LEFT SHOULDER: Primary | ICD-10-CM

## 2024-11-08 ENCOUNTER — OFFICE VISIT (OUTPATIENT)
Dept: FAMILY MEDICINE CLINIC | Facility: CLINIC | Age: 64
End: 2024-11-08
Payer: MEDICARE

## 2024-11-08 VITALS
HEART RATE: 48 BPM | SYSTOLIC BLOOD PRESSURE: 168 MMHG | BODY MASS INDEX: 24.38 KG/M2 | TEMPERATURE: 97.5 F | HEIGHT: 74 IN | DIASTOLIC BLOOD PRESSURE: 104 MMHG | WEIGHT: 190 LBS | OXYGEN SATURATION: 96 %

## 2024-11-08 DIAGNOSIS — M19.012 PRIMARY OSTEOARTHRITIS OF LEFT SHOULDER: ICD-10-CM

## 2024-11-08 DIAGNOSIS — R03.0 ELEVATED BLOOD PRESSURE READING IN OFFICE WITHOUT DIAGNOSIS OF HYPERTENSION: ICD-10-CM

## 2024-11-08 DIAGNOSIS — S46.812D INFRASPINATUS TENDON TEAR, LEFT, SUBSEQUENT ENCOUNTER: ICD-10-CM

## 2024-11-08 DIAGNOSIS — G89.29 CHRONIC LEFT SHOULDER PAIN: Primary | ICD-10-CM

## 2024-11-08 DIAGNOSIS — M25.512 CHRONIC LEFT SHOULDER PAIN: Primary | ICD-10-CM

## 2024-11-08 DIAGNOSIS — M25.612 DECREASED SHOULDER MOBILITY, LEFT: ICD-10-CM
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
## 2024-11-14 DIAGNOSIS — G50.0 TRIGEMINAL NEURALGIA: ICD-10-CM

## 2024-11-14 RX ORDER — OXCARBAZEPINE 600 MG/1
600 TABLET, FILM COATED ORAL 2 TIMES DAILY
Qty: 60 TABLET | Refills: 5 | Status: SHIPPED | OUTPATIENT
Start: 2024-11-14

## 2024-11-20 ENCOUNTER — OFFICE VISIT (OUTPATIENT)
Dept: FAMILY MEDICINE CLINIC | Facility: CLINIC | Age: 64
End: 2024-11-20
Payer: MEDICARE

## 2024-11-20 VITALS
OXYGEN SATURATION: 95 % | BODY MASS INDEX: 24.51 KG/M2 | HEIGHT: 74 IN | SYSTOLIC BLOOD PRESSURE: 166 MMHG | TEMPERATURE: 98.9 F | HEART RATE: 54 BPM | WEIGHT: 191 LBS | DIASTOLIC BLOOD PRESSURE: 90 MMHG

## 2024-11-20 DIAGNOSIS — I10 ESSENTIAL HYPERTENSION: Primary | ICD-10-CM

## 2024-11-20 PROCEDURE — 3077F SYST BP >= 140 MM HG: CPT | Performed by: NURSE PRACTITIONER

## 2024-11-20 PROCEDURE — 1160F RVW MEDS BY RX/DR IN RCRD: CPT | Performed by: NURSE PRACTITIONER

## 2024-11-20 PROCEDURE — 99213 OFFICE O/P EST LOW 20 MIN: CPT | Performed by: NURSE PRACTITIONER

## 2024-11-20 PROCEDURE — 1126F AMNT PAIN NOTED NONE PRSNT: CPT | Performed by: NURSE PRACTITIONER

## 2024-11-20 PROCEDURE — 1159F MED LIST DOCD IN RCRD: CPT | Performed by: NURSE PRACTITIONER

## 2024-11-20 PROCEDURE — 3080F DIAST BP >= 90 MM HG: CPT | Performed by: NURSE PRACTITIONER

## 2024-11-20 RX ORDER — LISINOPRIL 20 MG/1
20 TABLET ORAL DAILY
Qty: 30 TABLET | Refills: 5 | Status: SHIPPED | OUTPATIENT
Start: 2024-11-20

## 2024-11-20 NOTE — PROGRESS NOTES
"Answers submitted by the patient for this visit:  Primary Reason for Visit (Submitted on 11/19/2024)  What is the primary reason for your visit?: High Blood Pressure  Chief Complaint  Hypertension    Subjective        Ciro Shaw presents to [facility]  History of Present Illness  Patient presents with readings from home b/p testing. Only 5 of 18 are within normal range. The highest is 158/102. He continues to be asymptomatic.    Objective   Vital Signs:  /90 (BP Location: Left arm, Patient Position: Sitting, Cuff Size: Large Adult)   Pulse 54   Temp 98.9 °F (37.2 °C) (Temporal)   Ht 186.7 cm (73.5\")   Wt 86.6 kg (191 lb)   SpO2 95%   BMI 24.86 kg/m²   Estimated body mass index is 24.86 kg/m² as calculated from the following:    Height as of this encounter: 186.7 cm (73.5\").    Weight as of this encounter: 86.6 kg (191 lb).    BMI is within normal parameters. No other follow-up for BMI required.        Physical Exam  Vitals and nursing note reviewed.   Constitutional:       General: He is not in acute distress.     Appearance: Normal appearance. He is normal weight. He is not ill-appearing.   HENT:      Head: Normocephalic and atraumatic.   Cardiovascular:      Rate and Rhythm: Regular rhythm. Bradycardia present.      Heart sounds: Normal heart sounds.   Musculoskeletal:      Right lower leg: No edema.      Left lower leg: No edema.   Skin:     General: Skin is warm and dry.   Neurological:      Mental Status: He is alert and oriented to person, place, and time.        Result Review :                Assessment and Plan   Diagnoses and all orders for this visit:    1. Essential hypertension (Primary)  -     lisinopril (PRINIVIL,ZESTRIL) 20 MG tablet; Take 1 tablet by mouth Daily.  Dispense: 30 tablet; Refill: 5    Patient will continue to monitor for effectiveness of starting dose.         Follow Up   Return if symptoms worsen or fail to improve.  Patient was given " instructions and counseling regarding his condition or for health maintenance advice. Please see specific information pulled into the AVS if appropriate.     FRANCO Sanchez  This note is electronically signed.

## 2024-12-03 NOTE — PROGRESS NOTES
BUPivacaine (MARCAINE) 0.25 % injection 5 mg; 5 mg (2 mL), Intra-artICUlar, ONCE, 1 dose, On Wed 12/4/24 at 1545  -     triamcinolone acetonide (KENALOG-40) injection 40 mg; 40 mg, Intra-artICUlar, ONCE, 1 dose, On Wed 12/4/24 at 1545  -     lidocaine 1 % injection 2 mL; 2 mL, Intra-artICUlar, ONCE, 1 dose, On Wed 12/4/24 at 1545  -     DRAIN/INJECT LARGE JOINT/BURSA       Plan:  Return for 6 wk L shoulder no xray.   We recommend conservative treatment.  We recommend a trial of a corticosteroid injection today.  We recommend he continue with physical therapy.  We did briefly discuss evaluation of his cervical spine to rule out any radiculopathic symptoms.  He is established with a neurosurgeon.  He is in agreement with the plan.  We will see him back in 6 weeks for reevaluation.  All questions were answered.     Electronically signed by Yris Jain PA-C on 12/4/2024 at 4:20 PM

## 2024-12-04 ENCOUNTER — OFFICE VISIT (OUTPATIENT)
Age: 64
End: 2024-12-04

## 2024-12-04 VITALS — BODY MASS INDEX: 25.15 KG/M2 | HEIGHT: 74 IN | WEIGHT: 196 LBS

## 2024-12-04 DIAGNOSIS — M67.922 BICEPS TENDINOPATHY, LEFT: Primary | ICD-10-CM

## 2024-12-04 RX ORDER — OXCARBAZEPINE 600 MG/1
600 TABLET, FILM COATED ORAL 2 TIMES DAILY
COMMUNITY
Start: 2024-11-14

## 2024-12-04 RX ORDER — LIDOCAINE HYDROCHLORIDE 10 MG/ML
2 INJECTION, SOLUTION INFILTRATION; PERINEURAL ONCE
Status: COMPLETED | OUTPATIENT
Start: 2024-12-04 | End: 2024-12-04

## 2024-12-04 RX ORDER — ACYCLOVIR 400 MG/1
TABLET ORAL
COMMUNITY
Start: 2024-11-23

## 2024-12-04 RX ORDER — BUPIVACAINE HYDROCHLORIDE 2.5 MG/ML
2 INJECTION, SOLUTION INFILTRATION; PERINEURAL ONCE
Status: COMPLETED | OUTPATIENT
Start: 2024-12-04 | End: 2024-12-04

## 2024-12-04 RX ORDER — TRIAMCINOLONE ACETONIDE 40 MG/ML
40 INJECTION, SUSPENSION INTRA-ARTICULAR; INTRAMUSCULAR ONCE
Status: COMPLETED | OUTPATIENT
Start: 2024-12-04 | End: 2024-12-04

## 2024-12-04 RX ORDER — LISINOPRIL 20 MG/1
20 TABLET ORAL DAILY
COMMUNITY
Start: 2024-11-20

## 2024-12-04 RX ADMIN — BUPIVACAINE HYDROCHLORIDE 5 MG: 2.5 INJECTION, SOLUTION INFILTRATION; PERINEURAL at 15:25

## 2024-12-04 RX ADMIN — LIDOCAINE HYDROCHLORIDE 2 ML: 10 INJECTION, SOLUTION INFILTRATION; PERINEURAL at 15:27

## 2024-12-04 RX ADMIN — TRIAMCINOLONE ACETONIDE 40 MG: 40 INJECTION, SUSPENSION INTRA-ARTICULAR; INTRAMUSCULAR at 15:27

## 2025-01-11 NOTE — PROGRESS NOTES
Neurovascular intact.  Skin intact without signs of infection.    Assessment:   1. Biceps tendinopathy, left    Plan:  Return if symptoms worsen or fail to improve.   We recommend continued conservative treatment.  We recommend he continue with physical therapy and transition into a home independent exercise program when appropriate..  We did briefly discuss evaluation of his cervical spine to rule out any radiculopathic symptoms.  He is established with a neurosurgeon.  He will follow-up with us in the future on an as-needed basis.  He expresses understanding and is in agreement with the plan.  All questions were answered.    Electronically signed by Yris Jain PA-C on 1/14/2025 at 9:13 AM

## 2025-01-14 ENCOUNTER — OFFICE VISIT (OUTPATIENT)
Age: 65
End: 2025-01-14
Payer: MEDICARE

## 2025-01-14 VITALS — BODY MASS INDEX: 25.15 KG/M2 | WEIGHT: 196 LBS | HEIGHT: 74 IN

## 2025-01-14 DIAGNOSIS — M67.922 BICEPS TENDINOPATHY, LEFT: Primary | ICD-10-CM

## 2025-01-14 PROCEDURE — G8419 CALC BMI OUT NRM PARAM NOF/U: HCPCS

## 2025-01-14 PROCEDURE — 1159F MED LIST DOCD IN RCRD: CPT

## 2025-01-14 PROCEDURE — 1123F ACP DISCUSS/DSCN MKR DOCD: CPT

## 2025-01-14 PROCEDURE — 3017F COLORECTAL CA SCREEN DOC REV: CPT

## 2025-01-14 PROCEDURE — 99213 OFFICE O/P EST LOW 20 MIN: CPT

## 2025-01-14 PROCEDURE — 1036F TOBACCO NON-USER: CPT

## 2025-01-14 PROCEDURE — 1160F RVW MEDS BY RX/DR IN RCRD: CPT

## 2025-01-14 PROCEDURE — G8427 DOCREV CUR MEDS BY ELIG CLIN: HCPCS

## 2025-01-27 ENCOUNTER — OFFICE VISIT (OUTPATIENT)
Dept: NEUROSURGERY | Facility: CLINIC | Age: 65
End: 2025-01-27
Payer: MEDICARE

## 2025-01-27 VITALS — WEIGHT: 195.13 LBS | BODY MASS INDEX: 25.04 KG/M2 | HEIGHT: 74 IN

## 2025-01-27 DIAGNOSIS — G50.0 TRIGEMINAL NEURALGIA: Primary | ICD-10-CM

## 2025-01-27 DIAGNOSIS — E66.3 OVERWEIGHT WITH BODY MASS INDEX (BMI) OF 25 TO 25.9 IN ADULT: ICD-10-CM

## 2025-01-27 PROCEDURE — 99215 OFFICE O/P EST HI 40 MIN: CPT | Performed by: NEUROLOGICAL SURGERY

## 2025-01-27 PROCEDURE — 1160F RVW MEDS BY RX/DR IN RCRD: CPT | Performed by: NEUROLOGICAL SURGERY

## 2025-01-27 PROCEDURE — 1159F MED LIST DOCD IN RCRD: CPT | Performed by: NEUROLOGICAL SURGERY

## 2025-01-27 RX ORDER — CHLORHEXIDINE GLUCONATE 40 MG/ML
SOLUTION TOPICAL
Qty: 120 ML | Refills: 0 | Status: SHIPPED | OUTPATIENT
Start: 2025-01-27

## 2025-01-27 RX ORDER — DIAZEPAM 10 MG/1
1 TABLET ORAL NIGHTLY
COMMUNITY

## 2025-01-27 NOTE — PATIENT INSTRUCTIONS
" Right Microvascular Decompression  Ciro and ANA discussed right microvascular decompression of the trigeminal nerve.  Procedure is indicated and patients with an adequate medical control o with greater than 5 years of anticipated survival unable to tolerate the craniotomy.  F the relief is often long-lived, with an initial success rate of 85 to 98% and 70% pain control after 10 years.  Also there is only a 2% chance of facial anesthesia.  Risks: Mortality is less than 1%, aseptic meningitis 20%, 1 to 10% major neurological morbidity, failure rate 20 to 25%, and decreased hearing in the ipsilateral ear.        DASH Eating Plan  DASH stands for Dietary Approaches to Stop Hypertension. The DASH eating plan is a healthy eating plan that has been shown to:  Lower high blood pressure (hypertension).  Reduce your risk for type 2 diabetes, heart disease, and stroke.  Help with weight loss.  What are tips for following this plan?  Reading food labels  Check food labels for the amount of salt (sodium) per serving. Choose foods with less than 5 percent of the Daily Value (DV) of sodium. In general, foods with less than 300 milligrams (mg) of sodium per serving fit into this eating plan.  To find whole grains, look for the word \"whole\" as the first word in the ingredient list.  Shopping  Buy products labeled as \"low-sodium\" or \"no salt added.\"  Buy fresh foods. Avoid canned foods and pre-made or frozen meals.  Cooking  Try not to add salt when you cook. Use salt-free seasonings or herbs instead of table salt or sea salt. Check with your health care provider or pharmacist before using salt substitutes.  Do not avina foods. Cook foods in healthy ways, such as baking, boiling, grilling, roasting, or broiling.  Cook using oils that are good for your heart. These include olive, canola, avocado, soybean, and sunflower oil.  Meal planning    Eat a balanced diet. This should include:  4 or more servings of fruits and 4 or more servings " of vegetables each day. Try to fill half of your plate with fruits and vegetables.  6-8 servings of whole grains each day.  6 or less servings of lean meat, poultry, or fish each day. 1 oz is 1 serving. A 3 oz (85 g) serving of meat is about the same size as the palm of your hand. One egg is 1 oz (28 g).  2-3 servings of low-fat dairy each day. One serving is 1 cup (237 mL).  1 serving of nuts, seeds, or beans 5 times each week.  2-3 servings of heart-healthy fats. Healthy fats called omega-3 fatty acids are found in foods such as walnuts, flaxseeds, fortified milks, and eggs. These fats are also found in cold-water fish, such as sardines, salmon, and mackerel.  Limit how much you eat of:  Canned or prepackaged foods.  Food that is high in trans fat, such as fried foods.  Food that is high in saturated fat, such as fatty meat.  Desserts and other sweets, sugary drinks, and other foods with added sugar.  Full-fat dairy products.  Do not salt foods before eating.  Do not eat more than 4 egg yolks a week.  Try to eat at least 2 vegetarian meals a week.  Eat more home-cooked food and less restaurant, buffet, and fast food.  Lifestyle  When eating at a restaurant, ask if your food can be made with less salt or no salt.  If you drink alcohol:  Limit how much you have to:  0-1 drink a day if you are female.  0-2 drinks a day if you are male.  Know how much alcohol is in your drink. In the [place] one drink is one 12 oz bottle of beer (355 mL), one 5 oz glass of wine (148 mL), or one 1½ oz glass of hard liquor (44 mL).  General information  Avoid eating more than 2,300 mg of salt a day. If you have hypertension, you may need to reduce your sodium intake to 1,500 mg a day.  Work with your provider to stay at a healthy body weight or lose weight. Ask what the best weight range is for you.  On most days of the week, get at least 30 minutes of exercise that causes your heart to beat faster. This may include walking, swimming, or  biking.  Work with your provider or dietitian to adjust your eating plan to meet your specific calorie needs.  What foods should I eat?  Fruits  All fresh, dried, or frozen fruit. Canned fruits that are in their natural juice and do not have sugar added to them.  Vegetables  Fresh or frozen vegetables that are raw, steamed, roasted, or grilled. Low-sodium or reduced-sodium tomato and vegetable juice. Low-sodium or reduced-sodium tomato sauce and tomato paste. Low-sodium or reduced-sodium canned vegetables.  Grains  Whole-grain or whole-wheat bread. Whole-grain or whole-wheat pasta. Brown rice. Oatmeal. Quinoa. Bulgur. Whole-grain and low-sodium cereals. Giana bread. Low-fat, low-sodium crackers. Whole-wheat flour tortillas.  Meats and other proteins  Skinless chicken or turkey. Ground chicken or turkey. Pork with fat trimmed off. Fish and seafood. Egg whites. Dried beans, peas, or lentils. Unsalted nuts, nut butters, and seeds. Unsalted canned beans. Lean cuts of beef with fat trimmed off. Low-sodium, lean precooked or cured meat, such as sausages or meat loaves.  Dairy  Low-fat (1%) or fat-free (skim) milk. Reduced-fat, low-fat, or fat-free cheeses. Nonfat, low-sodium ricotta or cottage cheese. Low-fat or nonfat yogurt. Low-fat, low-sodium cheese.  Fats and oils  Soft margarine without trans fats. Vegetable oil. Reduced-fat, low-fat, or light mayonnaise and salad dressings (reduced-sodium). Canola, safflower, olive, avocado, soybean, and sunflower oils. Avocado.  Seasonings and condiments  Herbs. Spices. Seasoning mixes without salt.  Other foods  Unsalted popcorn and pretzels. Fat-free sweets.  The items listed above may not be all the foods and drinks you can have. Talk to a dietitian to learn more.  What foods should I avoid?  Fruits  Canned fruit in a light or heavy syrup. Fried fruit. Fruit in cream or butter sauce.  Vegetables  Creamed or fried vegetables. Vegetables in a cheese sauce. Regular canned  vegetables that are not marked as low-sodium or reduced-sodium. Regular canned tomato sauce and paste that are not marked as low-sodium or reduced-sodium. Regular tomato and vegetable juices that are not marked as low-sodium or reduced-sodium. Pickles. Olives.  Grains  Baked goods made with fat, such as croissants, muffins, or some breads. Dry pasta or rice meal packs.  Meats and other proteins  Fatty cuts of meat. Ribs. Fried meat. [place]. Bologna, salami, and other precooked or cured meats, such as sausages or meat loaves, that are not lean and low in sodium. Fat from the back of a pig (fatback). Bratwurst. Salted nuts and seeds. Canned beans with added salt. Canned or smoked fish. Whole eggs or egg yolks. Chicken or turkey with skin.  Dairy  Whole or 2% milk, cream, and half-and-half. Whole or full-fat cream cheese. Whole-fat or sweetened yogurt. Full-fat cheese. Nondairy creamers. Whipped toppings. Processed cheese and cheese spreads.  Fats and oils  Butter. Stick margarine. Lard. Shortening. Ghee. Alba fat. Tropical oils, such as coconut, palm kernel, or palm oil.  Seasonings and condiments  Onion salt, garlic salt, seasoned salt, table salt, and sea salt. [facility] sauce. Tartar sauce. Barbecue sauce. Teriyaki sauce. Soy sauce, including reduced-sodium soy sauce. Steak sauce. Canned and packaged gravies. Fish sauce. Oyster sauce. Cocktail sauce. Store-bought horseradish. Ketchup. Mustard. Meat flavorings and tenderizers. Bouillon cubes. Hot sauces. Pre-made or packaged marinades. Pre-made or packaged taco seasonings. Relishes. Regular salad dressings.  Other foods  Salted popcorn and pretzels.  The items listed above may not be all the foods and drinks you should avoid. Talk to a dietitian to learn more.  Where to find more information  National Heart, Lung, and Blood [facility] [place] (NHLBI): nhlbi.nih.gov  American Heart Association (AHA): heart.org  Academy of Nutrition and Dietetics: eatright.org  National  Kidney Foundation (NKF): kidney.org  This information is not intended to replace advice given to you by your health care provider. Make sure you discuss any questions you have with your health care provider.  Document Revised: 01/04/2024 Document Reviewed: 01/04/2024  Elsevier Patient Education © 2024 [employer]

## 2025-01-27 NOTE — PROGRESS NOTES
Primary Care Provider: Adore Ornelas APRN    Chief Complaint:   Chief Complaint   Patient presents with    Trigeminal Neuralgia     Patient here for 3mo follow up after starting oxcarbazepine        History of Present Illness  Ciro Shaw is a 64 y.o. male.     History of Present Illness    The patient presents for a follow-up of trigeminal neuralgia.    He has been experiencing episodic trigeminal neuralgia for the past 20 years, with episodes lasting a few months or being absent for up to two years. The pain is currently localized at the bridge of his nose and extends into his eyeball. He describes the pain as akin to a hot poker when he moves his tongue incorrectly. He reports that certain triggers such as ice water, vinegar, and wind can exacerbate his symptoms. He also notes that brushing his teeth on the right side is particularly painful. He reports no left-sided facial pain, with all symptoms being confined to the right side. He reports no new weakness, numbness, or tingling. He also mentions that he is unable to sleep on his left side due to the pain, and that rolling over to his right side can trigger the pain, although it subsides if he remains still. Certain triggers such as ice water, vinegar, and wind can exacerbate his symptoms. He was started on oxcarbazepine, which initially seemed to help but is no longer effective.    Supplemental Information  He is currently undergoing physical therapy for his left shoulder and reports generalized weakness.    SOCIAL HISTORY  He worked as an .    MEDICATIONS  Oxcarbazepine         Review of Systems   Constitutional: Negative.    HENT: Negative.     Eyes: Negative.    Respiratory: Negative.     Cardiovascular: Negative.    Gastrointestinal: Negative.    Endocrine: Negative.    Genitourinary: Negative.    Musculoskeletal: Negative.    Skin: Negative.    Allergic/Immunologic: Negative.    Neurological: Negative.    Hematological: Negative.     Psychiatric/Behavioral: Negative.         Past Medical History:   Diagnosis Date    Arthritis Years    Hypertension 2024    Seizures 2004    Facial    Trigeminal neuralgia        No past surgical history on file.    Family History: family history includes COPD in his mother; Cancer in his mother; Hearing loss in his father.    Social History:  reports that he quit smoking about 25 years ago. His smoking use included cigarettes. He started smoking about 55 years ago. He has a 60 pack-year smoking history. He has never used smokeless tobacco. He reports current alcohol use. He reports that he does not use drugs.    Medications:    Current Outpatient Medications:     acyclovir (ZOVIRAX) 400 MG tablet, Take 2 tablets by mouth Daily. Take no more than 5 doses a day., Disp: 60 tablet, Rfl: 5    Chlorhexidine Gluconate 4 % solution, Shower each day with solution for 5 days beginning 5 days before surgery., Disp: 120 mL, Rfl: 0    diazePAM (VALIUM) 10 MG tablet, Take 1 tablet by mouth Every Night., Disp: , Rfl:     lisinopril (PRINIVIL,ZESTRIL) 20 MG tablet, Take 1 tablet by mouth Daily., Disp: 30 tablet, Rfl: 5    OXcarbazepine (TRILEPTAL) 600 MG tablet, Take 1 tablet by mouth 2 (Two) Times a Day., Disp: 60 tablet, Rfl: 5    Allergies:  Sulfa antibiotics    Objective   Physical Exam  Eyes:      General: Lids are normal.      Extraocular Movements: Extraocular movements intact.      Pupils: Pupils are equal, round, and reactive to light.   Neurological:      Mental Status: He is alert.      Coordination: Coordination is intact.      Deep Tendon Reflexes:      Reflex Scores:       Tricep reflexes are 2+ on the right side and 2+ on the left side.       Bicep reflexes are 2+ on the right side and 2+ on the left side.       Brachioradialis reflexes are 2+ on the right side and 2+ on the left side.       Patellar reflexes are 2+ on the right side and 2+ on the left side.       Achilles reflexes are 2+ on the right side and 2+  on the left side.  Psychiatric:         Speech: Speech normal.       Neurological Exam  Mental Status  Alert. Speech is normal. Language is fluent with no aphasia. Attention and concentration are normal.    Cranial Nerves  CN I: Sense of smell is normal.  CN II: Visual acuity is normal.  CN III, IV, VI: Extraocular movements intact bilaterally. Normal lids and orbits bilaterally. Pupils equal round and reactive to light bilaterally.  CN V: Facial sensation is normal.  CN VII: Full and symmetric facial movement.  CN IX, X: Palate elevates symmetrically  CN XI: Shoulder shrug strength is normal.  CN XII: Tongue midline without atrophy or fasciculations.    Motor  Normal muscle bulk throughout. Normal muscle tone.                                               Right                     Left  Toe extension                        5                          5                                             Right                     Left  Deltoid                                   5                          5   Biceps                                   5                          5   Triceps                                  5                          5   Wrist extensor                       5                          5   Iliopsoas                               5                          5   Quadriceps                           5                          5   Anterior tibialis                      5                          5   Posterior tibialis                    5                          5    Sensory  Sensation is intact to light touch, pinprick, vibration and proprioception in all four extremities.    Reflexes                                            Right                      Left  Brachioradialis                    2+                         2+  Biceps                                 2+                         2+  Triceps                                2+                         2+  Patellar                                 2+                         2+  Achilles                                2+                         2+  Right Plantar: downgoing  Left Plantar: downgoing    Right pathological reflexes: Brandon's absent. Ankle clonus absent.  Left pathological reflexes: Brandon's absent. Ankle clonus absent.    Coordination    Finger-to-nose, rapid alternating movements and heel-to-shin normal bilaterally without dysmetria.    Gait  Casual gait is normal including stance, stride, and arm swing.        Imaging: (independent review and interpretation)  No radiology results for the last 30 days.      CT Angiogram Head  Order: [id number]  Impression    1. 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 This is nonenhancing on the angiogram and I suspect that  it is thrombosed.  Signed by Dr Louis York on 9/6/2018 3:01 PM    10/14/2024 -noncontrast MRI of the brain personally reviewed.  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  No evidence of mass lesions.  No evidence of MS lesions.        ASSESSMENT and PLAN  Ciro Shaw is a 64 y.o. male with a significant comorbidity of hypertension. He presents with a new problem of trigeminal neuralgia it has been present for 20 years having previously failed carbamazepine. Physical exam findings of neurologically intact.  His imaging, including MRI of the brain, shows superior cerebellar artery effacing the right trigeminal nerve without evidence of mass lesions or MS.    Right trigeminal neuralgia  Ciro presents with signs and symptoms consistent with trigeminal neuralgia.  Differential diagnosis includes trigeminal neuralgia vs atypical facial pain.      Treatment options: Today we discussed the risk benefits and possible complications of management for trigeminal neuralgia. 1) medical management, 2)  radiofrequency lesioning, 3) gamma knife to the dorsal root entry zone, 4) microvascular decompression.    Ciro has failed a trial of oxcarbazepine due to inefficacy.      Ciro and I discussed right microvascular decompression of the trigeminal nerve.  Procedure is indicated and patients with an adequate medical control o with greater than 5 years of anticipated survival unable to tolerate the craniotomy.  F the relief is often long-lived, with an initial success rate of 85 to 98% and 70% pain control after 10 years.  Also there is only a 2% chance of facial anesthesia.  Risks: Mortality is less than 1%, aseptic meningitis 20%, 1 to 10% major neurological morbidity, failure rate 20 to 25%, and decreased hearing in the ipsilateral ear.  He wants to proceed with surgery we will proceed with a right microvascular decompression.    Assessment & Plan  1. Trigeminal neuralgia.  The patient has been experiencing episodic trigeminal neuralgia for 20 years, with current symptoms affecting the bridge of the nose and the eyeball. He reports triggers including moving his tongue, ice water, and brushing his teeth. Despite being on oxcarbamazepine, his symptoms persist. MRI results confirm the presence of a blood vessel pressing on the trigeminal nerve on the right side, ruling out tumors or multiple sclerosis. A comprehensive discussion was held regarding the potential benefits and risks associated with microvascular decompression surgery. The procedure involves a small C-shaped incision behind the ear, drilling a hole in the skull, and placing a Denis sponge between the nerve and the blood vessel to relieve pressure. Risks include damage to the brainstem or nerve, stroke, seizure, bleeding, infection, and fluid leakage. Postoperative care includes monitoring in the ICU for one day and another day on the floor, with most patients experiencing immediate pain relief. A case request will be submitted for insurance approval,  which typically takes 4 weeks.              Diagnoses and all orders for this visit:    1. Trigeminal neuralgia (Primary)  -     Case Request; Standing  -     CBC (No Diff); Future  -     Comprehensive Metabolic Panel; Future  -     Urinalysis without microscopic (no culture) - Urine, Clean Catch; Future  -     MRSA Screen Culture (Outpatient) - Swab, Nares; Future  -     Type & Screen; Future  -     ECG 12 Lead; Future  -     XR Chest 1 View; Future  -     ceFAZolin (ANCEF) 2 g in sodium chloride 0.9 % 100 mL IVPB  -     Case Request    2. Overweight with body mass index (BMI) of 25 to 25.9 in adult    Other orders  -     Inpatient Admission; Standing  -     Follow Anesthesia Guidelines / Protocol; Future  -     Follow Anesthesia Guidelines / Protocol; Standing  -     Verify NPO Status; Standing  -     SCD (Sequential Compression Device) - Place on Patient in Pre-Op; Standing  -     Notify Provider (Specify); Standing  -     Verify / Perform Chlorhexidine Skin Prep; Standing  -     Provide NPO Instructions to Patient; Future  -     Chlorhexidine Skin Prep; Future  -     Provide Patient with Enhanced Recovery Booklet(s) or Handout; Future  -     Chlorhexidine Gluconate 4 % solution; Shower each day with solution for 5 days beginning 5 days before surgery.  Dispense: 120 mL; Refill: 0  -     Cardiac Monitoring; Standing          Return for POSTOPERATIVELY.    Thank you for this Consultation and the opportunity to participate in Ciro's care.    Sincerely,  Dae Shelton MD    I spent 24 minutes caring for Ciro on this date of service. This time includes time spent by me in the following activities: preparing for the visit, reviewing tests, obtaining and/or reviewing a separately obtained history, performing a medically appropriate examination and/or evaluation, counseling and educating the patient/family/caregiver, ordering medications, tests, or procedures, referring and communicating with other health  care professionals, documenting information in the medical record, independently interpreting results and communicating that information with the patient/family/caregiver, and/or care coordination.     Medical Decision Making (2/3)  Problem Points (2,3,4 or more)  Chronic w Severe Exacerbation (High)  Data Points (2,3,4 or more)  CATEGORY 1  INDEPENDENT INTERPRETATION Imaging = 1  ORDERED: Imaging (X-ray,CT, MRI) = 1.  ORDERED other tests (EMG, NCS, GENESIS, echo, ekg, PFT) = 1.  ORDERED Lab ordered = 2  CATEGORY 2  CATEGORY 3  Risk (Low, Mod, High)  Surgery: Major surgery with Risk Factors (High)    E/M = MDM 2 out of 3   or  Time  Est Level 5 - 48757 = High + (Same as Above but 2 of 3) + High Risk   or  60-74 min

## 2025-02-04 ENCOUNTER — TELEPHONE (OUTPATIENT)
Dept: NEUROSURGERY | Facility: CLINIC | Age: 65
End: 2025-02-04
Payer: MEDICARE

## 2025-02-04 NOTE — TELEPHONE ENCOUNTER
[address], [address]  [facility] [address]  Phone:[phone]  Fax: [phone]                       Dear FRANCO BAKER AKOSUA is scheduled for surgery on 4/3/25 with Dr VALERIA MOJICA.        Surgery name: CRANIOTOMY RETROSIGMOID for trigeminal neuralgia, right     In order to do this procedure, we are requesting surgical clearance from you. We request documentation stating patient clearance status. This can be an addendum to this encounter, an office note stating patient is cleared or a letter.                      Thank you,           Shirley Davis  Surgery Scheduling Coordinator   [facility]  [address]  [place] [address]  P: [phone]  F: [phone]

## 2025-02-14 DIAGNOSIS — I10 ESSENTIAL HYPERTENSION: ICD-10-CM

## 2025-02-14 DIAGNOSIS — I10 UNCONTROLLED HYPERTENSION: Primary | ICD-10-CM

## 2025-02-14 RX ORDER — CLONIDINE HYDROCHLORIDE 0.1 MG/1
TABLET ORAL
Qty: 30 TABLET | Refills: 2 | Status: SHIPPED | OUTPATIENT
Start: 2025-02-14

## 2025-02-14 RX ORDER — LISINOPRIL 40 MG/1
40 TABLET ORAL DAILY
Qty: 30 TABLET | Refills: 5 | Status: SHIPPED | OUTPATIENT
Start: 2025-02-14

## 2025-02-28 ENCOUNTER — OFFICE VISIT (OUTPATIENT)
Dept: FAMILY MEDICINE CLINIC | Facility: CLINIC | Age: 65
End: 2025-02-28
Payer: MEDICARE

## 2025-02-28 VITALS
HEART RATE: 60 BPM | TEMPERATURE: 97.7 F | HEIGHT: 74 IN | DIASTOLIC BLOOD PRESSURE: 92 MMHG | BODY MASS INDEX: 24.9 KG/M2 | WEIGHT: 194 LBS | SYSTOLIC BLOOD PRESSURE: 167 MMHG | OXYGEN SATURATION: 97 %

## 2025-02-28 DIAGNOSIS — I10 UNCONTROLLED HYPERTENSION: Primary | ICD-10-CM

## 2025-02-28 DIAGNOSIS — R11.2 NAUSEA AND VOMITING, UNSPECIFIED VOMITING TYPE: ICD-10-CM

## 2025-02-28 DIAGNOSIS — I10 ESSENTIAL HYPERTENSION: ICD-10-CM

## 2025-02-28 PROCEDURE — 3077F SYST BP >= 140 MM HG: CPT | Performed by: NURSE PRACTITIONER

## 2025-02-28 PROCEDURE — 1160F RVW MEDS BY RX/DR IN RCRD: CPT | Performed by: NURSE PRACTITIONER

## 2025-02-28 PROCEDURE — 1159F MED LIST DOCD IN RCRD: CPT | Performed by: NURSE PRACTITIONER

## 2025-02-28 PROCEDURE — 3080F DIAST BP >= 90 MM HG: CPT | Performed by: NURSE PRACTITIONER

## 2025-02-28 PROCEDURE — 99214 OFFICE O/P EST MOD 30 MIN: CPT | Performed by: NURSE PRACTITIONER

## 2025-02-28 PROCEDURE — 1126F AMNT PAIN NOTED NONE PRSNT: CPT | Performed by: NURSE PRACTITIONER

## 2025-02-28 RX ORDER — DILTIAZEM HYDROCHLORIDE 180 MG/1
180 CAPSULE, EXTENDED RELEASE ORAL EVERY EVENING
Qty: 30 CAPSULE | Refills: 5 | Status: SHIPPED | OUTPATIENT
Start: 2025-02-28

## 2025-02-28 RX ORDER — PROMETHAZINE HYDROCHLORIDE 25 MG/1
25 TABLET ORAL EVERY 8 HOURS PRN
Qty: 30 TABLET | Refills: 0 | Status: SHIPPED | OUTPATIENT
Start: 2025-02-28

## 2025-02-28 NOTE — PROGRESS NOTES
"Chief Complaint  Hypertension and Edema (Bilateral ankles )    Subjective        Ciro Shaw presents to [facility]  History of Present Illness  Patient feels that his elevations may be due to anxiety and stress. He is recently retired and has had major changes in his lifestyle related to the [employer]. It is running 160-180/. He has been using clonidine PRN and it does bring it down. The lowest has been 139/72. The lowest readings have been late afternoon/evening. He has had swelling of the ankles when he has taken clonidine. On days he doesn't take it, he does not swell.  He does feel that anxiety is playing a major role. He has had some nausea as well and one episode of vomiting.     Objective   Vital Signs:  /92 (BP Location: Left arm, Patient Position: Sitting, Cuff Size: Large Adult)   Pulse 60   Temp 97.7 °F (36.5 °C) (Temporal)   Ht 186.7 cm (73.5\")   Wt 88 kg (194 lb)   SpO2 97%   BMI 25.25 kg/m²   Estimated body mass index is 25.25 kg/m² as calculated from the following:    Height as of this encounter: 186.7 cm (73.5\").    Weight as of this encounter: 88 kg (194 lb).             Physical Exam  Vitals and nursing note reviewed.   Constitutional:       General: He is not in acute distress.     Appearance: Normal appearance. He is normal weight. He is not ill-appearing.   HENT:      Head: Normocephalic and atraumatic.   Cardiovascular:      Rate and Rhythm: Normal rate and regular rhythm.      Heart sounds: Normal heart sounds. No murmur heard.  Pulmonary:      Effort: Pulmonary effort is normal.      Breath sounds: Normal breath sounds.   Musculoskeletal:      Right lower leg: No edema.      Left lower leg: No edema.   Skin:     General: Skin is warm and dry.   Neurological:      Mental Status: He is alert and oriented to person, place, and time.   Psychiatric:         Thought Content: Thought content normal.        Result Review :              "   Assessment and Plan   Diagnoses and all orders for this visit:    1. Uncontrolled hypertension (Primary)  -     dilTIAZem XR (DILACOR XR) 180 MG 24 hr capsule; Take 1 capsule by mouth Every Evening.  Dispense: 30 capsule; Refill: 5    2. Essential hypertension  -     dilTIAZem XR (DILACOR XR) 180 MG 24 hr capsule; Take 1 capsule by mouth Every Evening.  Dispense: 30 capsule; Refill: 5    3. Nausea and vomiting, unspecified vomiting type  -     promethazine (PHENERGAN) 25 MG tablet; Take 1 tablet by mouth Every 8 (Eight) Hours As Needed for Nausea or Vomiting.  Dispense: 30 tablet; Refill: 0             Follow Up   Return if symptoms worsen or fail to improve.  Patient was given instructions and counseling regarding his condition or for health maintenance advice. Please see specific information pulled into the AVS if appropriate.     FRANCO Sanchez  This note is electronically signed.

## 2025-03-13 DIAGNOSIS — G50.0 TRIGEMINAL NEURALGIA OF RIGHT SIDE OF FACE: ICD-10-CM

## 2025-03-13 RX ORDER — ACYCLOVIR 400 MG/1
800 TABLET ORAL DAILY
Qty: 60 TABLET | Refills: 5 | Status: SHIPPED | OUTPATIENT
Start: 2025-03-13

## 2025-03-13 NOTE — TELEPHONE ENCOUNTER
Caller: Ciro Shaw    Relationship: Self    Best call back number:  [phone]      Requested Prescriptions     Pending Prescriptions Disp Refills    acyclovir (ZOVIRAX) 400 MG tablet 60 tablet 5     Sig: Take 2 tablets by mouth Daily. Take no more than 5 doses a day.        Pharmacy where request should be sent: [facility] [place], [place] - [address] [phone] [facility] [phone]      Last office visit with prescribing clinician: 2/28/2025   Last telemedicine visit with prescribing clinician: Visit date not found   Next office visit with prescribing clinician: Visit date not found     Additional details provided by patient:     WILL BE GOING ON VACATION    Does the patient have less than a 3 day supply:  [x] Yes  [] No    Would you like a call back once the refill request has been completed: [] Yes [] No    If the office needs to give you a call back, can they leave a voicemail: [] Yes [] No    Lisa Segura Rep   03/13/25 07:35 CDT

## 2025-03-31 ENCOUNTER — PRE-ADMISSION TESTING (OUTPATIENT)
Dept: PREADMISSION TESTING | Facility: HOSPITAL | Age: 65
DRG: 027 | End: 2025-03-31
Payer: MEDICARE

## 2025-03-31 ENCOUNTER — HOSPITAL ENCOUNTER (OUTPATIENT)
Dept: GENERAL RADIOLOGY | Facility: HOSPITAL | Age: 65
Discharge: HOME | End: 2025-03-31
Payer: MEDICARE

## 2025-03-31 VITALS
SYSTOLIC BLOOD PRESSURE: 175 MMHG | DIASTOLIC BLOOD PRESSURE: 99 MMHG | OXYGEN SATURATION: 98 % | HEART RATE: 60 BPM | HEIGHT: 73 IN | BODY MASS INDEX: 26.76 KG/M2 | WEIGHT: 201.94 LBS | RESPIRATION RATE: 18 BRPM

## 2025-03-31 DIAGNOSIS — G50.0 TRIGEMINAL NEURALGIA: ICD-10-CM

## 2025-03-31 LAB
ABO GROUP BLD: NORMAL
ALBUMIN SERPL-MCNC: 4.1 G/DL (ref 3.5–5.2)
ALBUMIN/GLOB SERPL: 1.5 G/DL
ALP SERPL-CCNC: 82 U/L (ref 39–117)
ALT SERPL W P-5'-P-CCNC: 25 U/L (ref 1–41)
ANION GAP SERPL CALCULATED.3IONS-SCNC: 10 MMOL/L (ref 5–15)
AST SERPL-CCNC: 34 U/L (ref 1–40)
BILIRUB SERPL-MCNC: 0.3 MG/DL (ref 0–1.2)
BILIRUB UR QL STRIP: NEGATIVE
BLD GP AB SCN SERPL QL: NEGATIVE
BUN SERPL-MCNC: 10 MG/DL (ref 8–23)
BUN/CREAT SERPL: 12.7 (ref 7–25)
CALCIUM SPEC-SCNC: 8.5 MG/DL (ref 8.6–10.5)
CHLORIDE SERPL-SCNC: 96 MMOL/L (ref 98–107)
CLARITY UR: CLEAR
CO2 SERPL-SCNC: 26 MMOL/L (ref 22–29)
COLOR UR: YELLOW
CREAT SERPL-MCNC: 0.79 MG/DL (ref 0.76–1.27)
DEPRECATED RDW RBC AUTO: 45.3 FL (ref 37–54)
EGFRCR SERPLBLD CKD-EPI 2021: 98 ML/MIN/1.73
ERYTHROCYTE [DISTWIDTH] IN BLOOD BY AUTOMATED COUNT: 13.3 % (ref 12.3–15.4)
GLOBULIN UR ELPH-MCNC: 2.7 GM/DL
GLUCOSE SERPL-MCNC: 99 MG/DL (ref 65–99)
GLUCOSE UR STRIP-MCNC: NEGATIVE MG/DL
HCT VFR BLD AUTO: 38.3 % (ref 37.5–51)
HGB BLD-MCNC: 13.3 G/DL (ref 13–17.7)
HGB UR QL STRIP.AUTO: NEGATIVE
KETONES UR QL STRIP: NEGATIVE
LEUKOCYTE ESTERASE UR QL STRIP.AUTO: NEGATIVE
MCH RBC QN AUTO: 31.8 PG (ref 26.6–33)
MCHC RBC AUTO-ENTMCNC: 34.7 G/DL (ref 31.5–35.7)
MCV RBC AUTO: 91.6 FL (ref 79–97)
NITRITE UR QL STRIP: NEGATIVE
PH UR STRIP.AUTO: 7 [PH] (ref 5–8)
PLATELET # BLD AUTO: 281 10*3/MM3 (ref 140–450)
PMV BLD AUTO: 9.3 FL (ref 6–12)
POTASSIUM SERPL-SCNC: 4.2 MMOL/L (ref 3.5–5.2)
PROT SERPL-MCNC: 6.8 G/DL (ref 6–8.5)
PROT UR QL STRIP: NEGATIVE
RBC # BLD AUTO: 4.18 10*6/MM3 (ref 4.14–5.8)
RH BLD: POSITIVE
SODIUM SERPL-SCNC: 132 MMOL/L (ref 136–145)
SP GR UR STRIP: 1.01 (ref 1–1.03)
T&S EXPIRATION DATE: NORMAL
UROBILINOGEN UR QL STRIP: NORMAL
WBC NRBC COR # BLD AUTO: 7.58 10*3/MM3 (ref 3.4–10.8)

## 2025-03-31 PROCEDURE — 85027 COMPLETE CBC AUTOMATED: CPT

## 2025-03-31 PROCEDURE — 80053 COMPREHEN METABOLIC PANEL: CPT

## 2025-03-31 PROCEDURE — 36415 COLL VENOUS BLD VENIPUNCTURE: CPT

## 2025-03-31 PROCEDURE — 71045 X-RAY EXAM CHEST 1 VIEW: CPT

## 2025-03-31 PROCEDURE — 86900 BLOOD TYPING SEROLOGIC ABO: CPT

## 2025-03-31 PROCEDURE — 87081 CULTURE SCREEN ONLY: CPT

## 2025-03-31 PROCEDURE — 93005 ELECTROCARDIOGRAM TRACING: CPT

## 2025-03-31 PROCEDURE — 93010 ELECTROCARDIOGRAM REPORT: CPT | Performed by: INTERNAL MEDICINE

## 2025-03-31 PROCEDURE — 86901 BLOOD TYPING SEROLOGIC RH(D): CPT

## 2025-03-31 PROCEDURE — 86850 RBC ANTIBODY SCREEN: CPT

## 2025-03-31 PROCEDURE — 81003 URINALYSIS AUTO W/O SCOPE: CPT

## 2025-03-31 RX ORDER — MULTIPLE VITAMINS W/ MINERALS TAB 9MG-400MCG
1 TAB ORAL DAILY
COMMUNITY

## 2025-03-31 NOTE — DISCHARGE INSTRUCTIONS

## 2025-04-01 LAB — MRSA SPEC QL CULT: NORMAL

## 2025-04-02 ENCOUNTER — ANESTHESIA EVENT (OUTPATIENT)
Dept: PERIOP | Facility: HOSPITAL | Age: 65
End: 2025-04-02
Payer: MEDICARE

## 2025-04-02 LAB
QT INTERVAL: 452 MS
QTC INTERVAL: 452 MS

## 2025-04-03 ENCOUNTER — ANESTHESIA (OUTPATIENT)
Dept: PERIOP | Facility: HOSPITAL | Age: 65
End: 2025-04-03
Payer: MEDICARE

## 2025-04-03 ENCOUNTER — APPOINTMENT (OUTPATIENT)
Dept: CT IMAGING | Facility: HOSPITAL | Age: 65
End: 2025-04-03
Payer: MEDICARE

## 2025-04-03 ENCOUNTER — HOSPITAL ENCOUNTER (INPATIENT)
Facility: HOSPITAL | Age: 65
LOS: 2 days | Discharge: HOME | End: 2025-04-05
Attending: NEUROLOGICAL SURGERY | Admitting: NEUROLOGICAL SURGERY
Payer: MEDICARE

## 2025-04-03 DIAGNOSIS — G50.0 TRIGEMINAL NEURALGIA: ICD-10-CM

## 2025-04-03 PROCEDURE — 25010000002 VASOPRESSIN 20 UNIT/ML SOLUTION: Performed by: NURSE ANESTHETIST, CERTIFIED REGISTERED

## 2025-04-03 PROCEDURE — C1713 ANCHOR/SCREW BN/BN,TIS/BN: HCPCS | Performed by: NEUROLOGICAL SURGERY

## 2025-04-03 PROCEDURE — 25010000002 PROCHLORPERAZINE 10 MG/2ML SOLUTION: Performed by: NURSE PRACTITIONER

## 2025-04-03 PROCEDURE — 99024 POSTOP FOLLOW-UP VISIT: CPT | Performed by: NURSE PRACTITIONER

## 2025-04-03 PROCEDURE — 25010000002 PROPOFOL 200 MG/20ML EMULSION: Performed by: NURSE ANESTHETIST, CERTIFIED REGISTERED

## 2025-04-03 PROCEDURE — 25010000002 HYDRALAZINE PER 20 MG: Performed by: NURSE PRACTITIONER

## 2025-04-03 PROCEDURE — 25010000002 CEFAZOLIN PER 500 MG: Performed by: NURSE PRACTITIONER

## 2025-04-03 PROCEDURE — 4A10X4G MONITORING OF CENTRAL NERVOUS ELECTRICAL ACTIVITY, INTRAOPERATIVE, EXTERNAL APPROACH: ICD-10-PCS | Performed by: NEUROLOGICAL SURGERY

## 2025-04-03 PROCEDURE — 70450 CT HEAD/BRAIN W/O DYE: CPT

## 2025-04-03 PROCEDURE — 25010000002 FENTANYL CITRATE (PF) 100 MCG/2ML SOLUTION: Performed by: NURSE ANESTHETIST, CERTIFIED REGISTERED

## 2025-04-03 PROCEDURE — 25010000002 ONDANSETRON PER 1 MG: Performed by: NURSE ANESTHETIST, CERTIFIED REGISTERED

## 2025-04-03 PROCEDURE — 25010000002 NICARDIPINE HCL IN NACL 20-0.9 MG/200ML-% SOLUTION: Performed by: NURSE PRACTITIONER

## 2025-04-03 PROCEDURE — 25810000003 LACTATED RINGERS PER 1000 ML: Performed by: NEUROLOGICAL SURGERY

## 2025-04-03 PROCEDURE — 25010000002 CEFAZOLIN PER 500 MG: Performed by: NEUROLOGICAL SURGERY

## 2025-04-03 PROCEDURE — 69990 MICROSURGERY ADD-ON: CPT | Performed by: NEUROLOGICAL SURGERY

## 2025-04-03 PROCEDURE — 25010000002 CLINDAMYCIN 900 MG/50ML SOLUTION: Performed by: NURSE ANESTHETIST, CERTIFIED REGISTERED

## 2025-04-03 PROCEDURE — S0260 H&P FOR SURGERY: HCPCS | Performed by: NEUROLOGICAL SURGERY

## 2025-04-03 PROCEDURE — C1889 IMPLANT/INSERT DEVICE, NOC: HCPCS | Performed by: NEUROLOGICAL SURGERY

## 2025-04-03 PROCEDURE — 61458 CRNEC SOPL XPL/DCMPR CRL NRV: CPT | Performed by: NEUROLOGICAL SURGERY

## 2025-04-03 PROCEDURE — 25010000002 DEXAMETHASONE PER 1 MG: Performed by: NURSE PRACTITIONER

## 2025-04-03 PROCEDURE — 25010000002 LIDOCAINE PF 2% 2 % SOLUTION: Performed by: NURSE ANESTHETIST, CERTIFIED REGISTERED

## 2025-04-03 PROCEDURE — 00NK0ZZ RELEASE TRIGEMINAL NERVE, OPEN APPROACH: ICD-10-PCS | Performed by: NEUROLOGICAL SURGERY

## 2025-04-03 PROCEDURE — 25010000002 DEXAMETHASONE PER 1 MG: Performed by: NURSE ANESTHETIST, CERTIFIED REGISTERED

## 2025-04-03 DEVICE — IMPLANTABLE DEVICE: Type: IMPLANTABLE DEVICE | Site: CRANIAL | Status: FUNCTIONAL

## 2025-04-03 DEVICE — HEMOST ABS SURGIFOAM SZ100 8X12 10MM: Type: IMPLANTABLE DEVICE | Site: CRANIAL | Status: FUNCTIONAL

## 2025-04-03 DEVICE — KT HEMOST ABS SURGIFOAM PORCN 1GRAM: Type: IMPLANTABLE DEVICE | Site: CRANIAL | Status: FUNCTIONAL

## 2025-04-03 DEVICE — DURAGEN® PLUS DURAL REGENERATION MATRIX, 1 IN X 3 IN (2.5 CM X 7.5 CM)
Type: IMPLANTABLE DEVICE | Site: CRANIAL | Status: FUNCTIONAL
Brand: DURAGEN® PLUS

## 2025-04-03 DEVICE — SCRW PLT NEURO LEFORTE L/P SLF/DRL 1.4X3.7MM SLVR: Type: IMPLANTABLE DEVICE | Site: CRANIAL | Status: FUNCTIONAL

## 2025-04-03 DEVICE — ABSORBABLE HEMOSTAT (OXIDIZED REGENERATED CELLULOSE)
Type: IMPLANTABLE DEVICE | Site: CRANIAL | Status: FUNCTIONAL
Brand: SURGICEL

## 2025-04-03 DEVICE — SEAL DURL ADHERUS/AUTOSPRAY HYDROGEL DS: Type: IMPLANTABLE DEVICE | Site: CRANIAL | Status: FUNCTIONAL

## 2025-04-03 RX ORDER — NALOXONE HCL 0.4 MG/ML
0.04 VIAL (ML) INJECTION AS NEEDED
Status: DISCONTINUED | OUTPATIENT
Start: 2025-04-03 | End: 2025-04-03 | Stop reason: HOSPADM

## 2025-04-03 RX ORDER — SODIUM CHLORIDE, SODIUM LACTATE, POTASSIUM CHLORIDE, CALCIUM CHLORIDE 600; 310; 30; 20 MG/100ML; MG/100ML; MG/100ML; MG/100ML
1000 INJECTION, SOLUTION INTRAVENOUS CONTINUOUS
Status: DISCONTINUED | OUTPATIENT
Start: 2025-04-03 | End: 2025-04-03

## 2025-04-03 RX ORDER — ACETAMINOPHEN 500 MG
1000 TABLET ORAL ONCE
Status: COMPLETED | OUTPATIENT
Start: 2025-04-03 | End: 2025-04-03

## 2025-04-03 RX ORDER — FLUMAZENIL 0.1 MG/ML
0.2 INJECTION INTRAVENOUS AS NEEDED
Status: DISCONTINUED | OUTPATIENT
Start: 2025-04-03 | End: 2025-04-03 | Stop reason: HOSPADM

## 2025-04-03 RX ORDER — ONDANSETRON 2 MG/ML
INJECTION INTRAMUSCULAR; INTRAVENOUS AS NEEDED
Status: DISCONTINUED | OUTPATIENT
Start: 2025-04-03 | End: 2025-04-03 | Stop reason: SURG

## 2025-04-03 RX ORDER — HYDROCODONE BITARTRATE AND ACETAMINOPHEN 7.5; 325 MG/1; MG/1
1 TABLET ORAL EVERY 4 HOURS PRN
Status: DISCONTINUED | OUTPATIENT
Start: 2025-04-03 | End: 2025-04-05 | Stop reason: HOSPADM

## 2025-04-03 RX ORDER — LABETALOL HYDROCHLORIDE 5 MG/ML
5 INJECTION, SOLUTION INTRAVENOUS
Status: DISCONTINUED | OUTPATIENT
Start: 2025-04-03 | End: 2025-04-03 | Stop reason: HOSPADM

## 2025-04-03 RX ORDER — SODIUM CHLORIDE 0.9 % (FLUSH) 0.9 %
3 SYRINGE (ML) INJECTION AS NEEDED
Status: DISCONTINUED | OUTPATIENT
Start: 2025-04-03 | End: 2025-04-03 | Stop reason: HOSPADM

## 2025-04-03 RX ORDER — ROCURONIUM BROMIDE 10 MG/ML
INJECTION, SOLUTION INTRAVENOUS AS NEEDED
Status: DISCONTINUED | OUTPATIENT
Start: 2025-04-03 | End: 2025-04-03 | Stop reason: SURG

## 2025-04-03 RX ORDER — CLINDAMYCIN PHOSPHATE 900 MG/50ML
INJECTION, SOLUTION INTRAVENOUS AS NEEDED
Status: DISCONTINUED | OUTPATIENT
Start: 2025-04-03 | End: 2025-04-03 | Stop reason: SURG

## 2025-04-03 RX ORDER — ACETAMINOPHEN 160 MG/5ML
650 SOLUTION ORAL EVERY 8 HOURS
Status: DISCONTINUED | OUTPATIENT
Start: 2025-04-03 | End: 2025-04-04

## 2025-04-03 RX ORDER — PROPOFOL 10 MG/ML
INJECTION, EMULSION INTRAVENOUS AS NEEDED
Status: DISCONTINUED | OUTPATIENT
Start: 2025-04-03 | End: 2025-04-03 | Stop reason: SURG

## 2025-04-03 RX ORDER — FENTANYL CITRATE 50 UG/ML
50 INJECTION, SOLUTION INTRAMUSCULAR; INTRAVENOUS
Status: DISCONTINUED | OUTPATIENT
Start: 2025-04-03 | End: 2025-04-03 | Stop reason: HOSPADM

## 2025-04-03 RX ORDER — MAGNESIUM HYDROXIDE 1200 MG/15ML
LIQUID ORAL AS NEEDED
Status: DISCONTINUED | OUTPATIENT
Start: 2025-04-03 | End: 2025-04-03 | Stop reason: HOSPADM

## 2025-04-03 RX ORDER — SUCCINYLCHOLINE/SOD CL,ISO/PF 200MG/10ML
SYRINGE (ML) INTRAVENOUS AS NEEDED
Status: DISCONTINUED | OUTPATIENT
Start: 2025-04-03 | End: 2025-04-03 | Stop reason: SURG

## 2025-04-03 RX ORDER — CLONIDINE HYDROCHLORIDE 0.1 MG/1
0.1 TABLET ORAL 3 TIMES DAILY PRN
COMMUNITY

## 2025-04-03 RX ORDER — LIDOCAINE HYDROCHLORIDE 10 MG/ML
0.5 INJECTION, SOLUTION EPIDURAL; INFILTRATION; INTRACAUDAL; PERINEURAL ONCE AS NEEDED
Status: DISCONTINUED | OUTPATIENT
Start: 2025-04-03 | End: 2025-04-03 | Stop reason: HOSPADM

## 2025-04-03 RX ORDER — DILTIAZEM HYDROCHLORIDE 180 MG/1
180 CAPSULE, COATED, EXTENDED RELEASE ORAL EVERY EVENING
Status: DISCONTINUED | OUTPATIENT
Start: 2025-04-03 | End: 2025-04-05 | Stop reason: HOSPADM

## 2025-04-03 RX ORDER — POLYETHYLENE GLYCOL 3350 17 G/17G
17 POWDER, FOR SOLUTION ORAL DAILY
Status: DISCONTINUED | OUTPATIENT
Start: 2025-04-03 | End: 2025-04-05 | Stop reason: HOSPADM

## 2025-04-03 RX ORDER — HYDROMORPHONE HYDROCHLORIDE 1 MG/ML
0.5 INJECTION, SOLUTION INTRAMUSCULAR; INTRAVENOUS; SUBCUTANEOUS
Status: DISCONTINUED | OUTPATIENT
Start: 2025-04-03 | End: 2025-04-03 | Stop reason: HOSPADM

## 2025-04-03 RX ORDER — SODIUM CHLORIDE 9 MG/ML
40 INJECTION, SOLUTION INTRAVENOUS AS NEEDED
Status: DISCONTINUED | OUTPATIENT
Start: 2025-04-03 | End: 2025-04-03 | Stop reason: HOSPADM

## 2025-04-03 RX ORDER — SODIUM CHLORIDE 0.9 % (FLUSH) 0.9 %
3-10 SYRINGE (ML) INJECTION AS NEEDED
Status: DISCONTINUED | OUTPATIENT
Start: 2025-04-03 | End: 2025-04-03 | Stop reason: HOSPADM

## 2025-04-03 RX ORDER — CHLORHEXIDINE GLUCONATE 500 MG/1
1 CLOTH TOPICAL DAILY
Status: DISCONTINUED | OUTPATIENT
Start: 2025-04-03 | End: 2025-04-05 | Stop reason: HOSPADM

## 2025-04-03 RX ORDER — FENTANYL CITRATE 50 UG/ML
INJECTION, SOLUTION INTRAMUSCULAR; INTRAVENOUS AS NEEDED
Status: DISCONTINUED | OUTPATIENT
Start: 2025-04-03 | End: 2025-04-03 | Stop reason: SURG

## 2025-04-03 RX ORDER — ONDANSETRON 4 MG/1
4 TABLET, ORALLY DISINTEGRATING ORAL EVERY 6 HOURS PRN
Status: DISCONTINUED | OUTPATIENT
Start: 2025-04-03 | End: 2025-04-05 | Stop reason: HOSPADM

## 2025-04-03 RX ORDER — MANNITOL 20 G/100ML
INJECTION, SOLUTION INTRAVENOUS CONTINUOUS PRN
Status: DISCONTINUED | OUTPATIENT
Start: 2025-04-03 | End: 2025-04-03 | Stop reason: SURG

## 2025-04-03 RX ORDER — BACITRACIN ZINC 500 [USP'U]/G
OINTMENT TOPICAL AS NEEDED
Status: DISCONTINUED | OUTPATIENT
Start: 2025-04-03 | End: 2025-04-03 | Stop reason: HOSPADM

## 2025-04-03 RX ORDER — LIDOCAINE HYDROCHLORIDE 20 MG/ML
INJECTION, SOLUTION EPIDURAL; INFILTRATION; INTRACAUDAL; PERINEURAL AS NEEDED
Status: DISCONTINUED | OUTPATIENT
Start: 2025-04-03 | End: 2025-04-03 | Stop reason: SURG

## 2025-04-03 RX ORDER — ONDANSETRON 2 MG/ML
4 INJECTION INTRAMUSCULAR; INTRAVENOUS
Status: DISCONTINUED | OUTPATIENT
Start: 2025-04-03 | End: 2025-04-03 | Stop reason: HOSPADM

## 2025-04-03 RX ORDER — DEXAMETHASONE SODIUM PHOSPHATE 4 MG/ML
4 INJECTION, SOLUTION INTRA-ARTICULAR; INTRALESIONAL; INTRAMUSCULAR; INTRAVENOUS; SOFT TISSUE EVERY 6 HOURS
Status: DISCONTINUED | OUTPATIENT
Start: 2025-04-03 | End: 2025-04-04

## 2025-04-03 RX ORDER — SODIUM CHLORIDE 0.9 % (FLUSH) 0.9 %
10 SYRINGE (ML) INJECTION AS NEEDED
Status: DISCONTINUED | OUTPATIENT
Start: 2025-04-03 | End: 2025-04-03 | Stop reason: HOSPADM

## 2025-04-03 RX ORDER — LISINOPRIL 20 MG/1
40 TABLET ORAL DAILY
Status: DISCONTINUED | OUTPATIENT
Start: 2025-04-03 | End: 2025-04-05 | Stop reason: HOSPADM

## 2025-04-03 RX ORDER — ONDANSETRON 2 MG/ML
4 INJECTION INTRAMUSCULAR; INTRAVENOUS EVERY 6 HOURS PRN
Status: DISCONTINUED | OUTPATIENT
Start: 2025-04-03 | End: 2025-04-05 | Stop reason: HOSPADM

## 2025-04-03 RX ORDER — BUPIVACAINE HYDROCHLORIDE AND EPINEPHRINE 2.5; 5 MG/ML; UG/ML
INJECTION, SOLUTION INFILTRATION; PERINEURAL AS NEEDED
Status: DISCONTINUED | OUTPATIENT
Start: 2025-04-03 | End: 2025-04-03 | Stop reason: HOSPADM

## 2025-04-03 RX ORDER — MIDAZOLAM HYDROCHLORIDE 2 MG/2ML
0.5 INJECTION, SOLUTION INTRAMUSCULAR; INTRAVENOUS
Status: DISCONTINUED | OUTPATIENT
Start: 2025-04-03 | End: 2025-04-03 | Stop reason: HOSPADM

## 2025-04-03 RX ORDER — HYDRALAZINE HYDROCHLORIDE 20 MG/ML
10 INJECTION INTRAMUSCULAR; INTRAVENOUS EVERY 6 HOURS PRN
Status: DISCONTINUED | OUTPATIENT
Start: 2025-04-03 | End: 2025-04-05 | Stop reason: HOSPADM

## 2025-04-03 RX ORDER — HEPARIN SODIUM 5000 [USP'U]/ML
5000 INJECTION, SOLUTION INTRAVENOUS; SUBCUTANEOUS EVERY 12 HOURS SCHEDULED
Status: DISCONTINUED | OUTPATIENT
Start: 2025-04-04 | End: 2025-04-05 | Stop reason: HOSPADM

## 2025-04-03 RX ORDER — ACETAMINOPHEN 325 MG/1
650 TABLET ORAL EVERY 8 HOURS
Status: DISCONTINUED | OUTPATIENT
Start: 2025-04-03 | End: 2025-04-04

## 2025-04-03 RX ORDER — HYDROCODONE BITARTRATE AND ACETAMINOPHEN 10; 325 MG/1; MG/1
1 TABLET ORAL EVERY 4 HOURS PRN
Status: DISCONTINUED | OUTPATIENT
Start: 2025-04-03 | End: 2025-04-05 | Stop reason: HOSPADM

## 2025-04-03 RX ORDER — EPHEDRINE SULFATE 50 MG/ML
INJECTION, SOLUTION INTRAVENOUS AS NEEDED
Status: DISCONTINUED | OUTPATIENT
Start: 2025-04-03 | End: 2025-04-03 | Stop reason: SURG

## 2025-04-03 RX ORDER — SODIUM CHLORIDE, SODIUM LACTATE, POTASSIUM CHLORIDE, CALCIUM CHLORIDE 600; 310; 30; 20 MG/100ML; MG/100ML; MG/100ML; MG/100ML
100 INJECTION, SOLUTION INTRAVENOUS CONTINUOUS
Status: DISCONTINUED | OUTPATIENT
Start: 2025-04-03 | End: 2025-04-03

## 2025-04-03 RX ORDER — AMOXICILLIN 250 MG
2 CAPSULE ORAL 2 TIMES DAILY
Status: DISCONTINUED | OUTPATIENT
Start: 2025-04-03 | End: 2025-04-05 | Stop reason: HOSPADM

## 2025-04-03 RX ORDER — DEXAMETHASONE SODIUM PHOSPHATE 4 MG/ML
INJECTION, SOLUTION INTRA-ARTICULAR; INTRALESIONAL; INTRAMUSCULAR; INTRAVENOUS; SOFT TISSUE AS NEEDED
Status: DISCONTINUED | OUTPATIENT
Start: 2025-04-03 | End: 2025-04-03 | Stop reason: SURG

## 2025-04-03 RX ORDER — ACETAMINOPHEN 650 MG/1
650 SUPPOSITORY RECTAL EVERY 8 HOURS
Status: DISCONTINUED | OUTPATIENT
Start: 2025-04-03 | End: 2025-04-04

## 2025-04-03 RX ORDER — OXYCODONE AND ACETAMINOPHEN 10; 325 MG/1; MG/1
1 TABLET ORAL EVERY 4 HOURS PRN
Status: DISCONTINUED | OUTPATIENT
Start: 2025-04-03 | End: 2025-04-03 | Stop reason: HOSPADM

## 2025-04-03 RX ORDER — OXCARBAZEPINE 300 MG/1
600 TABLET, FILM COATED ORAL 2 TIMES DAILY
Status: DISCONTINUED | OUTPATIENT
Start: 2025-04-03 | End: 2025-04-05 | Stop reason: HOSPADM

## 2025-04-03 RX ORDER — BUTALBITAL, ACETAMINOPHEN AND CAFFEINE 50; 325; 40 MG/1; MG/1; MG/1
1 TABLET ORAL EVERY 6 HOURS PRN
Status: DISCONTINUED | OUTPATIENT
Start: 2025-04-03 | End: 2025-04-05 | Stop reason: HOSPADM

## 2025-04-03 RX ORDER — SODIUM CHLORIDE 0.9 % (FLUSH) 0.9 %
3 SYRINGE (ML) INJECTION EVERY 12 HOURS SCHEDULED
Status: DISCONTINUED | OUTPATIENT
Start: 2025-04-03 | End: 2025-04-03 | Stop reason: HOSPADM

## 2025-04-03 RX ORDER — PROCHLORPERAZINE EDISYLATE 5 MG/ML
5 INJECTION INTRAMUSCULAR; INTRAVENOUS EVERY 6 HOURS PRN
Status: DISCONTINUED | OUTPATIENT
Start: 2025-04-03 | End: 2025-04-05 | Stop reason: HOSPADM

## 2025-04-03 RX ADMIN — DEXAMETHASONE SODIUM PHOSPHATE 4 MG: 4 INJECTION, SOLUTION INTRA-ARTICULAR; INTRALESIONAL; INTRAMUSCULAR; INTRAVENOUS; SOFT TISSUE at 13:46

## 2025-04-03 RX ADMIN — EPHEDRINE SULFATE 15 MG: 50 INJECTION, SOLUTION INTRAVENOUS at 08:59

## 2025-04-03 RX ADMIN — FENTANYL CITRATE 100 MCG: 50 INJECTION, SOLUTION INTRAMUSCULAR; INTRAVENOUS at 08:49

## 2025-04-03 RX ADMIN — FENTANYL CITRATE 100 MCG: 50 INJECTION, SOLUTION INTRAMUSCULAR; INTRAVENOUS at 08:42

## 2025-04-03 RX ADMIN — BUTALBITAL, ACETAMINOPHEN, AND CAFFEINE 1 TABLET: 50; 325; 40 TABLET ORAL at 21:58

## 2025-04-03 RX ADMIN — NICARDIPINE HYDROCHLORIDE 7.5 MG/HR: 0.1 INJECTION INTRAVENOUS at 23:58

## 2025-04-03 RX ADMIN — PROPOFOL 200 MG: 10 INJECTION, EMULSION INTRAVENOUS at 08:42

## 2025-04-03 RX ADMIN — SODIUM CHLORIDE, POTASSIUM CHLORIDE, SODIUM LACTATE AND CALCIUM CHLORIDE: 600; 310; 30; 20 INJECTION, SOLUTION INTRAVENOUS at 10:57

## 2025-04-03 RX ADMIN — HYDRALAZINE HYDROCHLORIDE 10 MG: 20 INJECTION INTRAMUSCULAR; INTRAVENOUS at 21:58

## 2025-04-03 RX ADMIN — ONDANSETRON 4 MG: 2 INJECTION INTRAMUSCULAR; INTRAVENOUS at 10:56

## 2025-04-03 RX ADMIN — ACETAMINOPHEN 650 MG: 325 TABLET, FILM COATED ORAL at 15:57

## 2025-04-03 RX ADMIN — PROCHLORPERAZINE EDISYLATE 5 MG: 5 INJECTION INTRAMUSCULAR; INTRAVENOUS at 13:47

## 2025-04-03 RX ADMIN — PROPOFOL 30 MG: 10 INJECTION, EMULSION INTRAVENOUS at 10:35

## 2025-04-03 RX ADMIN — DILTIAZEM HYDROCHLORIDE 180 MG: 180 CAPSULE, COATED, EXTENDED RELEASE ORAL at 17:35

## 2025-04-03 RX ADMIN — DEXAMETHASONE SODIUM PHOSPHATE 4 MG: 4 INJECTION, SOLUTION INTRA-ARTICULAR; INTRALESIONAL; INTRAMUSCULAR; INTRAVENOUS; SOFT TISSUE at 20:01

## 2025-04-03 RX ADMIN — NICARDIPINE HYDROCHLORIDE 5 MG/HR: 0.1 INJECTION INTRAVENOUS at 22:51

## 2025-04-03 RX ADMIN — PROPOFOL 30 MG: 10 INJECTION, EMULSION INTRAVENOUS at 10:40

## 2025-04-03 RX ADMIN — SENNOSIDES, DOCUSATE SODIUM 2 TABLET: 50; 8.6 TABLET, FILM COATED ORAL at 20:01

## 2025-04-03 RX ADMIN — DEXAMETHASONE SODIUM PHOSPHATE 8 MG: 4 INJECTION, SOLUTION INTRA-ARTICULAR; INTRALESIONAL; INTRAMUSCULAR; INTRAVENOUS; SOFT TISSUE at 11:16

## 2025-04-03 RX ADMIN — PROPOFOL 50 MG: 10 INJECTION, EMULSION INTRAVENOUS at 08:56

## 2025-04-03 RX ADMIN — SODIUM CHLORIDE, POTASSIUM CHLORIDE, SODIUM LACTATE AND CALCIUM CHLORIDE 1000 ML: 600; 310; 30; 20 INJECTION, SOLUTION INTRAVENOUS at 07:27

## 2025-04-03 RX ADMIN — LIDOCAINE HYDROCHLORIDE 100 MG: 20 INJECTION, SOLUTION EPIDURAL; INFILTRATION; INTRACAUDAL; PERINEURAL at 08:42

## 2025-04-03 RX ADMIN — Medication 1 APPLICATION: at 20:01

## 2025-04-03 RX ADMIN — FENTANYL CITRATE 100 MCG: 50 INJECTION, SOLUTION INTRAMUSCULAR; INTRAVENOUS at 09:41

## 2025-04-03 RX ADMIN — FENTANYL CITRATE 50 MCG: 50 INJECTION, SOLUTION INTRAMUSCULAR; INTRAVENOUS at 10:45

## 2025-04-03 RX ADMIN — CEFAZOLIN 2000 MG: 2 INJECTION, POWDER, FOR SOLUTION INTRAMUSCULAR; INTRAVENOUS at 17:35

## 2025-04-03 RX ADMIN — CEFAZOLIN 2 G: 2 INJECTION, POWDER, FOR SOLUTION INTRAMUSCULAR; INTRAVENOUS at 08:47

## 2025-04-03 RX ADMIN — BUTALBITAL, ACETAMINOPHEN, AND CAFFEINE 1 TABLET: 50; 325; 40 TABLET ORAL at 14:34

## 2025-04-03 RX ADMIN — FENTANYL CITRATE 50 MCG: 50 INJECTION, SOLUTION INTRAMUSCULAR; INTRAVENOUS at 10:37

## 2025-04-03 RX ADMIN — NICARDIPINE HYDROCHLORIDE 5 MG/HR: 0.1 INJECTION INTRAVENOUS at 12:58

## 2025-04-03 RX ADMIN — NICARDIPINE HYDROCHLORIDE 10 MG/HR: 0.1 INJECTION INTRAVENOUS at 17:33

## 2025-04-03 RX ADMIN — EPHEDRINE SULFATE 15 MG: 50 INJECTION, SOLUTION INTRAVENOUS at 09:44

## 2025-04-03 RX ADMIN — Medication 180 MG: at 08:42

## 2025-04-03 RX ADMIN — ROCURONIUM 10 MG: 50 INJECTION, SOLUTION INTRAVENOUS at 08:42

## 2025-04-03 RX ADMIN — POLYETHYLENE GLYCOL 3350 17 G: 17 POWDER, FOR SOLUTION ORAL at 15:57

## 2025-04-03 RX ADMIN — EPHEDRINE SULFATE 10 MG: 50 INJECTION, SOLUTION INTRAVENOUS at 10:15

## 2025-04-03 RX ADMIN — CHLORHEXIDINE GLUCONATE 1 APPLICATION: 500 CLOTH TOPICAL at 20:02

## 2025-04-03 RX ADMIN — ACETAMINOPHEN TAB 500 MG 1000 MG: 500 TAB at 08:07

## 2025-04-03 RX ADMIN — PROPOFOL 50 MG: 10 INJECTION, EMULSION INTRAVENOUS at 08:52

## 2025-04-03 RX ADMIN — NICARDIPINE HYDROCHLORIDE 10 MG/HR: 0.1 INJECTION INTRAVENOUS at 15:41

## 2025-04-03 RX ADMIN — MANNITOL: 20 INJECTION, SOLUTION INTRAVENOUS at 09:41

## 2025-04-03 RX ADMIN — CLINDAMYCIN PHOSPHATE 900 MG: 900 INJECTION, SOLUTION INTRAVENOUS at 09:34

## 2025-04-03 RX ADMIN — EPHEDRINE SULFATE 10 MG: 50 INJECTION, SOLUTION INTRAVENOUS at 09:50

## 2025-04-03 NOTE — ANESTHESIA PREPROCEDURE EVALUATION
Anesthesia Evaluation     Patient summary reviewed   no history of anesthetic complications:   NPO Solid Status: > 8 hours             Airway   Mallampati: II  Dental      Pulmonary    Cardiovascular   Exercise tolerance: excellent (>7 METS)    (+) hypertension  (-) past MI, cardiac stents      Neuro/Psych  GI/Hepatic/Renal/Endo    (-) diabetes    Musculoskeletal     Abdominal    Substance History      OB/GYN          Other                      Anesthesia Plan    ASA 2     general     (Unable to get arterial line --given hematoma, r/b no longer appropriate )  intravenous induction     Anesthetic plan, risks, benefits, and alternatives have been provided, discussed and informed consent has been obtained with: patient.      CODE STATUS:

## 2025-04-03 NOTE — ANESTHESIA POSTPROCEDURE EVALUATION
Patient: Ciro Shaw    Procedure Summary       Date: 04/03/25 Room / Location:  [facility] OR  /  [facility]    Anesthesia Start: 0837 Anesthesia Stop: 1142    Procedure: CRANIOTOMY RETROSIGMOID for trigeminal neuralgia, right (Right: Head) Diagnosis:       Trigeminal neuralgia      (Trigeminal neuralgia [G50.0])    Surgeons: Dae Shelton MD Provider: Shamika Ivy CRNA    Anesthesia Type: general ASA Status: 2            Anesthesia Type: general    Vitals  Vitals Value Taken Time   /86 04/03/25 12:30   Temp 98.9 °F (37.2 °C) 04/03/25 12:00   Pulse 59 04/03/25 12:30   Resp 14 04/03/25 12:00   SpO2 95 % 04/03/25 12:30           Post Anesthesia Care and Evaluation    Patient location during evaluation: PACU  Patient participation: complete - patient participated  Level of consciousness: awake and alert  Pain management: adequate    Airway patency: patent  Anesthetic complications: No anesthetic complications    Cardiovascular status: acceptable  Respiratory status: acceptable  Hydration status: acceptable    Comments: Blood pressure 147/85, pulse 61, temperature 98.9 °F (37.2 °C), resp. rate 14, weight 87.2 kg (192 lb 3.9 oz), SpO2 92%.    Pt discharged from PACU based on marisa score >8

## 2025-04-03 NOTE — PROGRESS NOTES
Daily Progress Note    ASSESSMENT:   Ciro Shaw is a 64 y.o. male with a significant comorbidity of hypertension. He presents with a new problem of trigeminal neuralgia it has been present for 20 years having previously failed carbamazepine. Physical exam findings of neurologically intact.  His imaging, including MRI of the brain, shows superior cerebellar artery effacing the right trigeminal nerve without evidence of mass lesions or MS.     Past Medical History:   Diagnosis Date    Arthritis Years    Bradycardia     Hypertension 2024    Seizures 2004    Facial    Trigeminal neuralgia      Active Hospital Problems    Diagnosis     **Trigeminal neuralgia      CHIEF COMPLAINT:  Right trigeminal neuralgia    PLAN:   Neuro: Exam at baseline.  No facial pain at present   * Day of Surgery * (4/3/2025) craniotomy for trigeminal neuralgia   Postop CT reviewed, no acute intracranial intracranial blood products, diffuse pneumocephalus   Neuroexams per policy.  Call for decline   Nonrebreather 2 hours on 2 hours off for pneumocephalus      CV: Continuous cardiac monitoring.  Keep A-line.  Cardene to keep SBP <140  Pulm: Continuous pulse oximetry.  O2.  Maintaining O2 sat.  : Remove Pickett ASAP.  Bladder scans and I/O cath per policy  FEN: Regular diet.  Advance as tolerated  GI: Persistent nausea and vomiting.  Add Compazine and steroids  ID: 23-hour postoperative prophylactic antibiotics  Heme:  DVT prophylaxis, SCDs  Pain: Tolerable at present  Dispo: PT/OT    Subjective  Symptoms stable    Temp:  [97.5 °F (36.4 °C)-99 °F (37.2 °C)] 98.9 °F (37.2 °C)  Heart Rate:  [55-84] 77  Resp:  [14-18] 14  BP: (120-159)/(76-94) 144/87    Objective:  Vital signs: (most recent): Blood pressure 144/87, pulse 77, temperature 98.9 °F (37.2 °C), resp. rate 14, weight 87.2 kg (192 lb 3.9 oz), SpO2 99%.        Neurological Exam  Mental Status  Awake, alert and oriented to person, place and time. Speech is normal. Language is fluent with no  aphasia. Attention and concentration are normal.    Cranial Nerves  CN II: Visual acuity is normal.  CN III, IV, VI: Extraocular movements intact bilaterally. Normal lids and orbits bilaterally. Pupils equal round and reactive to light bilaterally.  CN V: Facial sensation is normal.  CN VII: Full and symmetric facial movement.  CN IX, X: Palate elevates symmetrically  CN XI: Shoulder shrug strength is normal.    Motor  Normal muscle bulk throughout. Normal muscle tone.                                               Right                     Left  Deltoid                                   5                          5   Biceps                                   5                          5   Triceps                                  5                          5   Wrist extensor                       5                          5   Finger flexor                          5                          5   Iliopsoas                               5                          5   Quadriceps                           5                          5   Gastrocnemius                     5                           5   Anterior tibialis                      5                          5  Extensor hallucis longus      5                           5    Sensory  Light touch is normal in upper and lower extremities.     Coordination    Finger-to-nose, rapid alternating movements and heel-to-shin normal bilaterally without dysmetria.    Gait    Did not assess.  Defer to PT.    DRAINS:   Urethral Catheter Silicone 16 Fr. (Active)   Daily Indications Required activity restriction from trauma, surgery, (e.g. unstable spine, fracture, hemodynamics) 04/03/25 1226   Collection Container Standard drainage bag 04/03/25 1226   Securement Method Securing device 04/03/25 1226   Output (mL) 150 mL 04/03/25 1141     LAB RESULTS:  ABG:     CBC:   Results from last 7 days   Lab Units 03/31/25  1132   WBC 10*3/mm3 7.58   HEMOGLOBIN g/dL 13.3   HEMATOCRIT % 38.3    PLATELETS 10*3/mm3 281     BMP:  Results from last 7 days   Lab Units 03/31/25  1132   SODIUM mmol/L 132*   POTASSIUM mmol/L 4.2   CHLORIDE mmol/L 96*   CO2 mmol/L 26.0   BUN mg/dL 10   CREATININE mg/dL 0.79   GLUCOSE mg/dL 99   CALCIUM mg/dL 8.5*   ALT (SGPT) U/L 25     Culture Results:   MRSA Screen Cx   Date Value Ref Range Status   03/31/2025   Final    No Methicillin Resistant Staphylococcus aureus isolated       Imaging Results (Last 24 Hours)       Procedure Component Value Units Date/Time    CT Head Without Contrast [[id number]] Collected: 04/03/25 1301     Updated: 04/03/25 1315    Narrative:      CT HEAD WO CONTRAST- 4/3/2025 11:11 AM     HISTORY: Evaluation status post craniotomy for tumor      COMPARISON: 10/14/2024 MRI brain     TOTAL DOSE LENGTH PRODUCT: 837.98 mGy.cm. Automated exposure control was  also utilized to decrease patient radiation dose.     TECHNIQUE: Axial images of brain obtained without contrast.     FINDINGS: Patient has undergone a right retrosigmoid craniotomy with a  small yeison hole/craniotomy site seen posterior to the right mastoid.  There is diffuse pneumocephalus of the cisterns extending along the  sulci of the bilateral frontal lobes with a single air bubble seen in  the frontal horn of the right lateral ventricle. Nonspecific 4 mm  rounded hyperdensity along the fourth ventricle on series 3 image 10. No  extra-axial hematoma. No acute signs of ischemia.     No air-fluid levels in the paranasal sinuses. Minimal partial  opacification posterior mastoid air cells.       Impression:      1. Right retrosigmoid craniotomy with postoperative pneumocephalus.  Nonspecific 4 mm hyperdensity along the right fourth ventricle. No  extra-axial hematoma.        This report was signed and finalized on 4/3/2025 1:12 PM by Dr. Stephanie Vazquez MD.             Lab Results (last 24 hours)       ** No results found for the last 24 hours. **            Zachary Harkins APRN

## 2025-04-03 NOTE — H&P
Primary Care Provider: Adore Ornelas APRN    Chief Complaint:   No chief complaint on file.      History of Present Illness  Ciro Shaw is a 64 y.o. male.     History of Present Illness    The patient presents for a follow-up of trigeminal neuralgia.    He has been experiencing episodic trigeminal neuralgia for the past 20 years, with episodes lasting a few months or being absent for up to two years. The pain is currently localized at the bridge of his nose and extends into his eyeball. He describes the pain as akin to a hot poker when he moves his tongue incorrectly. He reports that certain triggers such as ice water, vinegar, and wind can exacerbate his symptoms. He also notes that brushing his teeth on the right side is particularly painful. He reports no left-sided facial pain, with all symptoms being confined to the right side. He reports no new weakness, numbness, or tingling. He also mentions that he is unable to sleep on his left side due to the pain, and that rolling over to his right side can trigger the pain, although it subsides if he remains still. Certain triggers such as ice water, vinegar, and wind can exacerbate his symptoms. He was started on oxcarbazepine, which initially seemed to help but is no longer effective.    Supplemental Information  He is currently undergoing physical therapy for his left shoulder and reports generalized weakness.    SOCIAL HISTORY  He worked as an .    MEDICATIONS  Oxcarbazepine         Review of Systems   Constitutional: Negative.    HENT: Negative.     Eyes: Negative.    Respiratory: Negative.     Cardiovascular: Negative.    Gastrointestinal: Negative.    Endocrine: Negative.    Genitourinary: Negative.    Musculoskeletal: Negative.    Skin: Negative.    Allergic/Immunologic: Negative.    Neurological: Negative.    Hematological: Negative.    Psychiatric/Behavioral: Negative.         Past Medical History:   Diagnosis Date    Arthritis Years     Bradycardia     Hypertension 2024    Seizures 2004    Facial    Trigeminal neuralgia        Past Surgical History:   Procedure Laterality Date    APPENDECTOMY         Family History: family history includes COPD in his mother; Cancer in his mother; Hearing loss in his father.    Social History:  reports that he quit smoking about 25 years ago. His smoking use included cigarettes. He started smoking about 55 years ago. He has a 60 pack-year smoking history. He has never used smokeless tobacco. He reports that he does not currently use alcohol. He reports that he does not use drugs.    Medications:    Current Facility-Administered Medications:     ceFAZolin 2000 mg IVPB in 100 mL NS (MBP), 2 g, Intravenous, Once, Dae Shelton MD    lactated ringers infusion 1,000 mL, 1,000 mL, Intravenous, Continuous, Dae Shelton MD    lactated ringers infusion 1,000 mL, 1,000 mL, Intravenous, Continuous, Dae Shelton MD, Last Rate: 25 mL/hr at 04/03/25 0727, 1,000 mL at 04/03/25 0727    lidocaine PF 1% (XYLOCAINE) injection 0.5 mL, 0.5 mL, Intradermal, Once PRN, Dae Shelton MD    sodium chloride 0.9 % flush 10 mL, 10 mL, Intravenous, PRN, Dae Shelton MD    sodium chloride 0.9 % flush 3 mL, 3 mL, Intravenous, PRN, Dae Shelton MD    Allergies:  Sulfa antibiotics    Objective   Physical Exam  Eyes:      General: Lids are normal.      Extraocular Movements: Extraocular movements intact.      Pupils: Pupils are equal, round, and reactive to light.   Neurological:      Mental Status: He is alert.      Coordination: Coordination is intact.      Deep Tendon Reflexes:      Reflex Scores:       Tricep reflexes are 2+ on the right side and 2+ on the left side.       Bicep reflexes are 2+ on the right side and 2+ on the left side.       Brachioradialis reflexes are 2+ on the right side and 2+ on the left side.       Patellar reflexes are 2+ on the right side and 2+ on the left  side.       Achilles reflexes are 2+ on the right side and 2+ on the left side.  Psychiatric:         Speech: Speech normal.       Neurological Exam  Mental Status  Alert. Speech is normal. Language is fluent with no aphasia. Attention and concentration are normal.    Cranial Nerves  CN I: Sense of smell is normal.  CN II: Visual acuity is normal.  CN III, IV, VI: Extraocular movements intact bilaterally. Normal lids and orbits bilaterally. Pupils equal round and reactive to light bilaterally.  CN V: Facial sensation is normal.  CN VII: Full and symmetric facial movement.  CN IX, X: Palate elevates symmetrically  CN XI: Shoulder shrug strength is normal.  CN XII: Tongue midline without atrophy or fasciculations.    Motor  Normal muscle bulk throughout. Normal muscle tone.                                               Right                     Left  Toe extension                        5                          5                                             Right                     Left  Deltoid                                   5                          5   Biceps                                   5                          5   Triceps                                  5                          5   Wrist extensor                       5                          5   Iliopsoas                               5                          5   Quadriceps                           5                          5   Anterior tibialis                      5                          5   Posterior tibialis                    5                          5    Sensory  Sensation is intact to light touch, pinprick, vibration and proprioception in all four extremities.    Reflexes                                            Right                      Left  Brachioradialis                    2+                         2+  Biceps                                 2+                         2+  Triceps                                2+                          2+  Patellar                                2+                         2+  Achilles                                2+                         2+  Right Plantar: downgoing  Left Plantar: downgoing    Right pathological reflexes: Brandon's absent. Ankle clonus absent.  Left pathological reflexes: Brandon's absent. Ankle clonus absent.    Coordination    Finger-to-nose, rapid alternating movements and heel-to-shin normal bilaterally without dysmetria.    Gait  Casual gait is normal including stance, stride, and arm swing.        Imaging: (independent review and interpretation)  XR Chest 1 View  Result Date: 3/31/2025  1.  No acute process in the chest.  This report was signed and finalized on 3/31/2025 11:31 AM by Dr Louis York.          CT Angiogram Head  Order: [id number]  Impression    1. 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 This is nonenhancing on the angiogram and I suspect that  it is thrombosed.  Signed by Dr Louis York on 9/6/2018 3:01 PM    10/14/2024 -noncontrast MRI of the brain personally reviewed.  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  No evidence of mass lesions.  No evidence of MS lesions.        ASSESSMENT and PLAN  Ciro Shaw is a 64 y.o. male with a significant comorbidity of hypertension. He presents with a new problem of trigeminal neuralgia it has been present for 20 years having previously failed carbamazepine. Physical exam findings of neurologically intact.  His imaging, including MRI of the brain, shows superior cerebellar artery effacing the right trigeminal nerve without evidence of mass lesions or MS.    Right trigeminal neuralgia  Ciro presents with signs and symptoms consistent with trigeminal neuralgia.  Differential diagnosis includes trigeminal neuralgia vs  atypical facial pain.      Treatment options: Today we discussed the risk benefits and possible complications of management for trigeminal neuralgia. 1) medical management, 2) radiofrequency lesioning, 3) gamma knife to the dorsal root entry zone, 4) microvascular decompression.    Ciro has failed a trial of oxcarbazepine due to inefficacy.      Ciro and I discussed right microvascular decompression of the trigeminal nerve.  Procedure is indicated and patients with an adequate medical control o with greater than 5 years of anticipated survival unable to tolerate the craniotomy.  F the relief is often long-lived, with an initial success rate of 85 to 98% and 70% pain control after 10 years.  Also there is only a 2% chance of facial anesthesia.  Risks: Mortality is less than 1%, aseptic meningitis 20%, 1 to 10% major neurological morbidity, failure rate 20 to 25%, and decreased hearing in the ipsilateral ear.  He wants to proceed with surgery we will proceed with a right microvascular decompression.    Assessment & Plan  1. Trigeminal neuralgia.  The patient has been experiencing episodic trigeminal neuralgia for 20 years, with current symptoms affecting the bridge of the nose and the eyeball. He reports triggers including moving his tongue, ice water, and brushing his teeth. Despite being on oxcarbamazepine, his symptoms persist. MRI results confirm the presence of a blood vessel pressing on the trigeminal nerve on the right side, ruling out tumors or multiple sclerosis. A comprehensive discussion was held regarding the potential benefits and risks associated with microvascular decompression surgery. The procedure involves a small C-shaped incision behind the ear, drilling a hole in the skull, and placing a Denis sponge between the nerve and the blood vessel to relieve pressure. Risks include damage to the brainstem or nerve, stroke, seizure, bleeding, infection, and fluid leakage. Postoperative care includes  monitoring in the ICU for one day and another day on the floor, with most patients experiencing immediate pain relief. A case request will be submitted for insurance approval, which typically takes 4 weeks.              Diagnoses and all orders for this visit:    1. Trigeminal neuralgia  -     ceFAZolin 2000 mg IVPB in 100 mL NS (MBP)    Other orders  -     Inpatient Admission; Standing  -     Follow Anesthesia Guidelines / Protocol; Standing  -     Verify NPO Status; Standing  -     SCD (Sequential Compression Device) - Place on Patient in Pre-Op; Standing  -     Notify Provider (Specify); Standing  -     Verify / Perform Chlorhexidine Skin Prep; Standing  -     Inpatient Admission  -     Follow Anesthesia Guidelines / Protocol  -     Verify NPO Status  -     SCD (Sequential Compression Device) - Place on Patient in Pre-Op  -     Notify Provider (Specify)  -     Verify / Perform Chlorhexidine Skin Prep  -     Follow Anesthesia Guidelines / Protocol; Standing  -     Please Insert a Second Peripheral IV If Indicated For Procedure (All DaVinci Cases, Gastric Bypass, Sleeve Surgery, AAA, Any Vascular Bypass, Carotid, Sigmoidectomy, Colectomy (Lap Assisted), Colon Resection, Nissen, Exploratory Laparotomy, Spinal...; Standing  -     Insert Peripheral IV; Standing  -     lidocaine PF 1% (XYLOCAINE) injection 0.5 mL  -     Cancel: Maintain IV Access; Standing  -     sodium chloride 0.9 % flush 3 mL  -     lactated ringers infusion 1,000 mL  -     Insert Peripheral IV; Standing  -     Maintain IV Access; Standing  -     sodium chloride 0.9 % flush 10 mL  -     lactated ringers infusion 1,000 mL  -     Follow Anesthesia Guidelines / Protocol  -     Please Insert a Second Peripheral IV If Indicated For Procedure (All DaVinci Cases, Gastric Bypass, Sleeve Surgery, AAA, Any Vascular Bypass, Carotid, Sigmoidectomy, Colectomy (Lap Assisted), Colon Resection, Nissen, Exploratory Laparotomy, Spinal...  -     Insert Peripheral  IV  -     Cancel: Maintain IV Access  -     Insert Peripheral IV  -     Maintain IV Access          No follow-ups on file.    Thank you for this Consultation and the opportunity to participate in Ciro's care.    Sincerely,  Dae Shelton MD    I spent 24 minutes caring for Ciro on this date of service. This time includes time spent by me in the following activities: preparing for the visit, reviewing tests, obtaining and/or reviewing a separately obtained history, performing a medically appropriate examination and/or evaluation, counseling and educating the patient/family/caregiver, ordering medications, tests, or procedures, referring and communicating with other health care professionals, documenting information in the medical record, independently interpreting results and communicating that information with the patient/family/caregiver, and/or care coordination.     Medical Decision Making (2/3)  Problem Points (2,3,4 or more)  Chronic w Severe Exacerbation (High)  Data Points (2,3,4 or more)  CATEGORY 1  INDEPENDENT INTERPRETATION Imaging = 1  ORDERED: Imaging (X-ray,CT, MRI) = 1.  ORDERED other tests (EMG, NCS, GENESIS, echo, ekg, PFT) = 1.  ORDERED Lab ordered = 2  CATEGORY 2  CATEGORY 3  Risk (Low, Mod, High)  Surgery: Major surgery with Risk Factors (High)    E/M = MDM 2 out of 3   or  Time  Est Level 5 - 52304 = High + (Same as Above but 2 of 3) + High Risk   or  60-74 min

## 2025-04-03 NOTE — ANESTHESIA PROCEDURE NOTES
Airway  Reason: elective    Date/Time: 4/3/2025 8:46 AM  Airway not difficult    General Information and Staff    Patient location during procedure: OR  CRNA/CAA: Shamika Ivy CRNA    Indications and Patient Condition  Indications for airway management: airway protection    Preoxygenated: yes    Mask difficulty assessment: 1 - vent by mask    Final Airway Details    Final airway type: endotracheal airway      Successful airway: ETT  Cuffed: yes   Successful intubation technique: direct laryngoscopy  Adjuncts used in placement: intubating stylet  Endotracheal tube insertion site: oral  Blade: Angelique  Blade size: 3.5  ETT size (mm): 8.0  Cormack-Lehane Classification: grade IIa - partial view of glottis  Placement verified by: chest auscultation and capnometry   Cuff volume (mL): 8  Measured from: lips  ETT/EBT  to lips (cm): 25  Number of attempts at approach: 1  Assessment: lips, teeth, and gum same as pre-op and atraumatic intubation    Additional Comments  atraumatic

## 2025-04-03 NOTE — DISCHARGE INSTRUCTIONS
[facility] Neurosurgery    Postoperative care following brain surgery  Dear Patient,  You have recently undergone brain surgery (craniotomy for trigeminal neuralgia) and are now ready to go home. These written instructions are intended to help you to recover quickly.  If you have ANY QUESTIONS about your condition prior to discharge please ask Dr. Shelton. In particular, if you have concerns about going home discuss them now. We do not want you to go until you are completely comfortable leaving the hospital.   If you have ANY QUESTIONS about your condition after you go home call your doctor. The number is [phone] which is answered 24 hours a day. During regular working hours a  will connect you to your doctor, one of his partners, or one of our nurses. At night or on weekends the answering service will connect you with the physicianon call. DO NOT HESITATE to call. We want to help you with any problems.     Seizures  Anyone who has brain surgery has a small risk of seizures postoperatively. Seizures may involve involuntary shaking of the arms and legs, loss of consciousness, loss of urinary or bowel control. They typically last a few minutes, then the patient returns to normal. Seizures are frightening to watch, but usually resolve without long-term problems. Your doctor will often attempt to prevent seizures after surgery by putting you on anti-seizure medicine like Dilantin or Keppra. Sometimes seizures occur even when these medicines are used  What to do if a seizure occurs:  If a seizure occurs and the patient does not return to normal in 10 minutes, call your Dr. Shleton or go to the local emergency room.   If multiple seizures occur, call 911.     Neurological Deficit  Neurological deficits are problems with brain function like speech difficulty, weakness, numbness, imbalance, etc. These deficits may be present before or after brain surgery. Prior to discharge Dr. Shelton will make sure  that all treatment needed to help you recover from such deficits has been instituted. He will also make sure that these deficits are stable or improving. After you go home, if you think any of your brain problems are getting worse, not better, it may be a sign of bleeding, infection, or other problems. Call Dr. Shelton. He will order tests and prescribe treatment as needed.    Deep vein thrombosis/ pulmonary embolus  Some patients who undergo surgery develop blood clots in the veins of the legs. These clots can cause pain or swelling in the legs, or may cause no obvious problem. They can break free from the legs and travel to the lungs causing shortness of breath and/or chest pain. If you develop pain or swelling in your legs after surgery, call your doctor. If you develop breathing problems or chest pain after surgery, call 911.    Medication  It is important to take your medication EXACTLY as prescribed. Some patients are reluctant to take pain medication. It is perfectly fine to take pain medication for several weeks after surgery. We want to eliminate pain whenever possible. Many pain medications can cause nausea (sick to your stomach), constipation (inability to poop), or itching. Nausea may be minimized by taking the medication with food. Constipation can be relieved by taking stool softeners and/ or laxatives that you can purchase over the counter as needed.    It is important to realize that no pain after surgery is an unrealistic expectation.  Pain medication will never reduce your pain score to zero.  The goal of pain medicine is to reduce your pain to the point you can move, take care of yourself, and participate in therapy.  Make sure to work with your caregiver to determine what is an adequate level of pain control to promote healthy movement and then take your medication to reach this goal.      You have undergone a Craniotomy (1) surgery which you are expected to recover from over several weeks.      Use of opioids immediately following surgery is often necessary.  Pain after surgery results in decreased quality of life, surgical complications, and prolonged rehabilitation.  Thus in certain situations, the benefits of a limited course of opioids may outweigh the risk.      However, multiple studies have found that patients of all ages frequently take fewer opioid pills than the amount prescribed after common surgeries.  In some cases they do not take any of the prescribed medications at all.  This results in excess opioid pills that are accessible to others, raise a concern for misuse, can be stolen by family members, can result in later addiction, or can often be used in overdose.  Therefore we are going to make every effort to only prescribe as much pain medication as necessary to limit the amount of pills that are left over after you recover.      First-line treatment should include nonopioid analgesics.  Examples of these would be Tylenol or nonsteroidal anti-inflammatories such as ibuprofen or Aleve.  - Tylenol 1000 mg every six hours as needed    Second-line treatment. If the above first-line treatments are insufficient to control your pain you have also been provided a small dose of opioids.  In order to avoid addiction you should take the lowest amount possible.    Type I: Opioid prescription for 3 days or less (every 6 hours).  May consider an additional 3-day course (every 8 hours)    EXAMPLE IF APPLICABLE:  Please taper your pain medications to avoid long term addiction.  Week 1: Take your pain medication no more than ever 6 hours as needed for severe pain.  Week 2: Take your pain medication no more than every 8 hours as needed for severe pain.  Week 3: Take your pain medication no more than every 12 hours as needed for unresolved pain.    These recommendations are based on ...  CDC guidelines for control and prevention of postsurgical pain,   [place] opioid prescribing engagement network (OPEN),    Lalita larson in [facility] medical directors group prescribing opioids for postoperative pain-supplemental guidance (2018)    Wound Care  Your incision is held together with either staples that need to be removed in 2 weeks or dissolvable sutures that do not need to be removed.     Seventy-two hours after surgery it is OK to get the wound wet, so you can take a shower or bath.     You do not need to put any medication (like Neosporin or Vitamin E) on the wound. Scrubbing the wound should be avoided until the staples nondissolvable sutures come out.     No nicotine products, including second-hand smoke, gum or patches. Nicotine will delay healing and increase the likelihood of a surgical complication. For help quitting, call the Quitline: 1-804.727.7696     Potential wound problems include the following:  Infection--If the wound becomes red, tender, swollen, or warm it may be infected. Infection is often accompanied by fever. If you think your wound might be infected you should call your doctor. Often you can send us a picture of the wound so we can better evaluate it.   Drainage--Fluid should not drain from your wound. If it does, call your doctor. Colored fluid may indicate infection. Clear fluid may indicate leakage of spinal fluid.   Dehiscence--If the wound does not heal properly it may open up along the staple line. This is called dehiscence. Call us immediately.   Sutures--Occasionally, one of the buried threads (sutures) may work through the skin. If you think this has happened call your doctor.   Swelling--Spinal fluid or blood may collect under the skin. This is usually harmless, but needs to be evaluated. Call your doctor.     Hydrocephalus  Your brain manufactures about one quart of clear fluid (called cerebrospinal fluid or CSF) every day. Normally this fluid is reabsorbed into the blood stream. After brain surgery the flow of fluid and the reabsorption process may be impaired. This  leads to a buildup of water on the brain that is called hydrocephalus. Hydrocephalus can cause headache, somnolence, difficulty thinking, imbalance and loss of urinary control. If you think that the patient may have hydrocephalus, call your doctor. She/He will order a brain scan. If it shows water buildup a simple operation called a shunt may be needed to fix the problem.    How to contact your doctor  The neurosurgeon, Dr. Shelton, and her/his team did your surgery and, therefore, are likely to know more about your condition than any other physicians. We are immediately available to help you with any problems after surgery. Please call us for any concerns at the following numbers:   Doctor’s office [phone] (answered 24 hours a day)   [facility]'s  [phone] (alternative emergency number for on-call neurosurgeon)     Specific instructions related to your surgery  Diet: no restrictions, eat a heart healthy diet.   Activity: as tolerated, no heavy lifting or strenuous exercise for at least 2 weeks.  ?  Follow up:   Follow up with Zachary GAMEZ in 4/17/2025 for post op wound check and with Dr. Shelton in the neurosurgery clinic ([phone]) on 5/12/2025.  We have scheduled these appointment(s) for you.

## 2025-04-04 LAB
ANION GAP SERPL CALCULATED.3IONS-SCNC: 13 MMOL/L (ref 5–15)
BASOPHILS # BLD AUTO: 0.02 10*3/MM3 (ref 0–0.2)
BASOPHILS NFR BLD AUTO: 0.1 % (ref 0–1.5)
BUN SERPL-MCNC: 13 MG/DL (ref 8–23)
BUN/CREAT SERPL: 15.7 (ref 7–25)
CALCIUM SPEC-SCNC: 9.3 MG/DL (ref 8.6–10.5)
CHLORIDE SERPL-SCNC: 99 MMOL/L (ref 98–107)
CO2 SERPL-SCNC: 22 MMOL/L (ref 22–29)
CREAT SERPL-MCNC: 0.83 MG/DL (ref 0.76–1.27)
DEPRECATED RDW RBC AUTO: 43.6 FL (ref 37–54)
EGFRCR SERPLBLD CKD-EPI 2021: 96.5 ML/MIN/1.73
EOSINOPHIL # BLD AUTO: 0.01 10*3/MM3 (ref 0–0.4)
EOSINOPHIL NFR BLD AUTO: 0.1 % (ref 0.3–6.2)
ERYTHROCYTE [DISTWIDTH] IN BLOOD BY AUTOMATED COUNT: 13.2 % (ref 12.3–15.4)
GLUCOSE SERPL-MCNC: 137 MG/DL (ref 65–99)
HCT VFR BLD AUTO: 41.9 % (ref 37.5–51)
HGB BLD-MCNC: 14.5 G/DL (ref 13–17.7)
IMM GRANULOCYTES # BLD AUTO: 0.11 10*3/MM3 (ref 0–0.05)
IMM GRANULOCYTES NFR BLD AUTO: 0.6 % (ref 0–0.5)
LYMPHOCYTES # BLD AUTO: 0.46 10*3/MM3 (ref 0.7–3.1)
LYMPHOCYTES NFR BLD AUTO: 2.7 % (ref 19.6–45.3)
MCH RBC QN AUTO: 31.3 PG (ref 26.6–33)
MCHC RBC AUTO-ENTMCNC: 34.6 G/DL (ref 31.5–35.7)
MCV RBC AUTO: 90.5 FL (ref 79–97)
MONOCYTES # BLD AUTO: 0.5 10*3/MM3 (ref 0.1–0.9)
MONOCYTES NFR BLD AUTO: 2.9 % (ref 5–12)
NEUTROPHILS NFR BLD AUTO: 16.15 10*3/MM3 (ref 1.7–7)
NEUTROPHILS NFR BLD AUTO: 93.6 % (ref 42.7–76)
NRBC BLD AUTO-RTO: 0 /100 WBC (ref 0–0.2)
PLATELET # BLD AUTO: 296 10*3/MM3 (ref 140–450)
PMV BLD AUTO: 9 FL (ref 6–12)
POTASSIUM SERPL-SCNC: 4.4 MMOL/L (ref 3.5–5.2)
RBC # BLD AUTO: 4.63 10*6/MM3 (ref 4.14–5.8)
SODIUM SERPL-SCNC: 134 MMOL/L (ref 136–145)
WBC NRBC COR # BLD AUTO: 17.25 10*3/MM3 (ref 3.4–10.8)

## 2025-04-04 PROCEDURE — 80048 BASIC METABOLIC PNL TOTAL CA: CPT | Performed by: NURSE PRACTITIONER

## 2025-04-04 PROCEDURE — 99024 POSTOP FOLLOW-UP VISIT: CPT | Performed by: NURSE PRACTITIONER

## 2025-04-04 PROCEDURE — 25010000002 NICARDIPINE HCL IN NACL 20-0.9 MG/200ML-% SOLUTION: Performed by: NURSE PRACTITIONER

## 2025-04-04 PROCEDURE — 25010000002 HEPARIN (PORCINE) PER 1000 UNITS: Performed by: NURSE PRACTITIONER

## 2025-04-04 PROCEDURE — 97161 PT EVAL LOW COMPLEX 20 MIN: CPT

## 2025-04-04 PROCEDURE — 25010000002 HYDRALAZINE PER 20 MG: Performed by: NURSE PRACTITIONER

## 2025-04-04 PROCEDURE — 25010000002 CEFAZOLIN PER 500 MG: Performed by: NURSE PRACTITIONER

## 2025-04-04 PROCEDURE — 25010000002 DEXAMETHASONE PER 1 MG: Performed by: NURSE PRACTITIONER

## 2025-04-04 PROCEDURE — 85025 COMPLETE CBC W/AUTO DIFF WBC: CPT | Performed by: NURSE PRACTITIONER

## 2025-04-04 PROCEDURE — 97165 OT EVAL LOW COMPLEX 30 MIN: CPT | Performed by: OCCUPATIONAL THERAPIST

## 2025-04-04 RX ORDER — ACETAMINOPHEN 325 MG/1
650 TABLET ORAL EVERY 8 HOURS PRN
Status: DISCONTINUED | OUTPATIENT
Start: 2025-04-04 | End: 2025-04-05 | Stop reason: HOSPADM

## 2025-04-04 RX ORDER — ACETAMINOPHEN 650 MG/1
650 SUPPOSITORY RECTAL EVERY 8 HOURS PRN
Status: DISCONTINUED | OUTPATIENT
Start: 2025-04-04 | End: 2025-04-05 | Stop reason: HOSPADM

## 2025-04-04 RX ORDER — ACETAMINOPHEN 160 MG/5ML
650 SOLUTION ORAL EVERY 8 HOURS PRN
Status: DISCONTINUED | OUTPATIENT
Start: 2025-04-04 | End: 2025-04-05 | Stop reason: HOSPADM

## 2025-04-04 RX ADMIN — Medication 1 APPLICATION: at 08:17

## 2025-04-04 RX ADMIN — LISINOPRIL 40 MG: 20 TABLET ORAL at 08:18

## 2025-04-04 RX ADMIN — DEXAMETHASONE SODIUM PHOSPHATE 4 MG: 4 INJECTION, SOLUTION INTRA-ARTICULAR; INTRALESIONAL; INTRAMUSCULAR; INTRAVENOUS; SOFT TISSUE at 01:02

## 2025-04-04 RX ADMIN — DILTIAZEM HYDROCHLORIDE 180 MG: 180 CAPSULE, COATED, EXTENDED RELEASE ORAL at 21:16

## 2025-04-04 RX ADMIN — NICARDIPINE HYDROCHLORIDE 5 MG/HR: 0.1 INJECTION INTRAVENOUS at 03:12

## 2025-04-04 RX ADMIN — HEPARIN SODIUM 5000 UNITS: 5000 INJECTION, SOLUTION INTRAVENOUS; SUBCUTANEOUS at 08:16

## 2025-04-04 RX ADMIN — ACETAMINOPHEN 650 MG: 325 TABLET, FILM COATED ORAL at 21:33

## 2025-04-04 RX ADMIN — HEPARIN SODIUM 5000 UNITS: 5000 INJECTION, SOLUTION INTRAVENOUS; SUBCUTANEOUS at 21:18

## 2025-04-04 RX ADMIN — CHLORHEXIDINE GLUCONATE 1 APPLICATION: 500 CLOTH TOPICAL at 08:19

## 2025-04-04 RX ADMIN — CEFAZOLIN 2000 MG: 2 INJECTION, POWDER, FOR SOLUTION INTRAMUSCULAR; INTRAVENOUS at 00:55

## 2025-04-04 RX ADMIN — POLYETHYLENE GLYCOL 3350 17 G: 17 POWDER, FOR SOLUTION ORAL at 08:18

## 2025-04-04 RX ADMIN — HYDRALAZINE HYDROCHLORIDE 10 MG: 20 INJECTION INTRAMUSCULAR; INTRAVENOUS at 13:25

## 2025-04-04 RX ADMIN — ACETAMINOPHEN 650 MG: 325 TABLET, FILM COATED ORAL at 08:24

## 2025-04-04 RX ADMIN — HYDRALAZINE HYDROCHLORIDE 10 MG: 20 INJECTION INTRAMUSCULAR; INTRAVENOUS at 21:56

## 2025-04-04 RX ADMIN — SENNOSIDES, DOCUSATE SODIUM 2 TABLET: 50; 8.6 TABLET, FILM COATED ORAL at 21:15

## 2025-04-04 RX ADMIN — Medication 1 APPLICATION: at 21:15

## 2025-04-04 RX ADMIN — CEFAZOLIN 2000 MG: 2 INJECTION, POWDER, FOR SOLUTION INTRAMUSCULAR; INTRAVENOUS at 08:16

## 2025-04-04 RX ADMIN — SENNOSIDES, DOCUSATE SODIUM 2 TABLET: 50; 8.6 TABLET, FILM COATED ORAL at 08:16

## 2025-04-04 RX ADMIN — ACETAMINOPHEN 650 MG: 325 TABLET, FILM COATED ORAL at 13:19

## 2025-04-04 RX ADMIN — ACETAMINOPHEN 650 MG: 325 TABLET, FILM COATED ORAL at 00:55

## 2025-04-04 NOTE — THERAPY DISCHARGE NOTE
Acute Care - Occupational Therapy Discharge  [facility]    Patient Name: Ciro Shaw  : 1960    MRN: [id number]                              Today's Date: 2025       Admit Date: 4/3/2025    Visit Dx:     ICD-10-CM ICD-9-CM   1. Trigeminal neuralgia  G50.0 350.1     Patient Active Problem List   Diagnosis    Trigeminal neuralgia of right side of face    Essential hypertension    Chronic left shoulder pain    Decreased shoulder mobility, left    Trigeminal neuralgia     Past Medical History:   Diagnosis Date    Arthritis Years    Bradycardia     Hypertension     Seizures     Facial    Trigeminal neuralgia      Past Surgical History:   Procedure Laterality Date    APPENDECTOMY      CRANIOTOMY Right 4/3/2025    Procedure: CRANIOTOMY RETROSIGMOID for trigeminal neuralgia, right;  Surgeon: Dae Shelton MD;  Location: [facility];  Service: Neurosurgery;  Laterality: Right;      General Information       Row Name 25          OT Time and Intention    Mode of Treatment occupational therapy  -       Row Name 25 09          General Information    Patient Profile Reviewed yes  -JW     Prior Level of Function independent:;all household mobility;transfer;bed mobility;ADL's  -     Existing Precautions/Restrictions fall  -     Barriers to Rehab medically complex  -       Row Name 25 09          Living Environment    Current Living Arrangements home  -       Row Name 25 09          Cognition    Orientation Status (Cognition) oriented x 4  -       Row Name 25          Safety Issues/Impairments Affecting Functional Mobility    Impairments Affecting Function (Mobility) pain  -JW               User Key  (r) = Recorded By, (t) = Taken By, (c) = Cosigned By      Initials Name Provider Type    Stephanie Dyer OTR/L, CSRS Occupational Therapist                   Mobility/ADL's       Row Name 25          Bed Mobility    Bed Mobility  supine-sit  -     Supine-Sit [place] (Bed Mobility) supervision  -     Assistive Device (Bed Mobility) head of bed elevated  -JW       Row Name 04/04/25 0903          Transfers    Transfers sit-stand transfer;stand-sit transfer  -JW       Row Name 04/04/25 0903          Sit-Stand Transfer    Sit-Stand [place] (Transfers) supervision  -JW       Row Name 04/04/25 0903          Stand-Sit Transfer    Stand-Sit [place] (Transfers) supervision  -JW       Row Name 04/04/25 0903          Functional Mobility    Functional Mobility- Ind. Level supervision required  -     Functional Mobility- Comment amb around unit  -JW       Row Name 04/04/25 0903          Activities of Daily Living    BADL Assessment/Intervention lower body dressing  -JW       Row Name 04/04/25 0903          Lower Body Dressing Assessment/Training    K[place] Level (Lower Body Dressing) don;socks;independent  -     Position (Lower Body Dressing) edge of bed sitting  -               User Key  (r) = Recorded By, (t) = Taken By, (c) = Cosigned By      Initials Name Provider Type     Stephanie Irizarry, LESLIE/L, CSRS Occupational Therapist                   Obj/Interventions       Row Name 04/04/25 0903          Sensory Assessment (Somatosensory)    Sensory Assessment (Somatosensory) UE sensation intact  -[facility] 04/04/25 0903          Vision Assessment/Intervention    Visual Impairment/Limitations WFL  -JW       Row Name 04/04/25 0903          Range of Motion Comprehensive    General Range of Motion bilateral upper extremity ROM WFL  -JW       Row Name 04/04/25 0903          Strength Comprehensive (MMT)    Comment, General Manual Muscle Testing (MMT) Assessment B UE strength 5/5  -JW       Row Name 04/04/25 0903          Balance    Balance Assessment sitting static balance;sitting dynamic balance;standing static balance;standing dynamic balance  -     Static Sitting Balance independent  -     Dynamic Sitting Balance  supervision  -     Position, Sitting Balance unsupported;sitting edge of bed  -     Static Standing Balance supervision  -     Dynamic Standing Balance supervision  -     Position/Device Used, Standing Balance unsupported  -               User Key  (r) = Recorded By, (t) = Taken By, (c) = Cosigned By      Initials Name Provider Type     Stephanie Irizarry, OTR/L, CSRS Occupational Therapist                   Goals/Plan    No documentation.                  Clinical Impression       [facility] Name 04/04/25 0903          Pain Assessment    Pain Location face;head  -     Pain Side/Orientation right  -     Pain Management Interventions exercise or physical activity utilized  -     Response to Pain Interventions no change per patient report  -     Additional Documentation Pain Scale: FACES Pre/Post-Treatment (Group)  -[facility] Name 04/04/25 0903          Pain Scale: FACES Pre/Post-Treatment    Pain: FACES Scale, Pretreatment 2-->hurts little bit  -     Posttreatment Pain Rating 2-->hurts little bit  -[facility] Name 04/04/25 0903          Plan of Care Review    Plan of Care Reviewed With patient  -     Outcome Evaluation OT eval completed. Pt presents alert and oriented x4, sitting up in bed. He reports at baseline being independent with all adls and mobility, residing at home with spouse. Today he was able to complete all bed mobility with Mod I. Independent with lower body dressing. Sup for all sit <> stand t/fs from EOB and ambulated around hallway. No overt neuro deficits identified at this time. OT to sign off as he remains independent. Anticipate d/c home with spouse.  -[facility] Name 04/04/25 0903          Therapy Assessment/Plan (OT)    Criteria for Skilled Therapeutic Interventions Met (OT) no;does not meet criteria for skilled intervention  -     Therapy Frequency (OT) evaluation only  -JW       Row Name 04/04/25 0903          Therapy Plan Review/Discharge Plan (OT)    Anticipated  Discharge Disposition (OT) home with assist  -       Row Name 04/04/25 0903          Positioning and Restraints    Pre-Treatment Position in bed  -JW     Post Treatment Position chair  -JW     In Chair notified nsg;reclined;call light within reach;encouraged to call for assist;patient within staff view  -JW               User Key  (r) = Recorded By, (t) = Taken By, (c) = Cosigned By      Initials Name Provider Type    Stephanie Dyer, OTR/L, CSRS Occupational Therapist                   Outcome Measures       Row Name 04/04/25 0903          How much help from another is currently needed...    Putting on and taking off regular lower body clothing? 4  -JW     Bathing (including washing, rinsing, and drying) 4  -JW     Toileting (which includes using toilet bed pan or urinal) 4  -JW     Putting on and taking off regular upper body clothing 4  -JW     Taking care of personal grooming (such as brushing teeth) 4  -JW     Eating meals 4  -     AM-PAC 6 Clicks Score (OT) 24  -JW       Row Name 04/04/25 0856 04/04/25 0800       How much help from another person do you currently need...    Turning from your back to your side while in flat bed without using bedrails? 4  -ANDRES 4  -DA    Moving from lying on back to sitting on the side of a flat bed without bedrails? 4  -ANDRES 4  -DA    Moving to and from a bed to a chair (including a wheelchair)? 4  -ANDRES 3  -DA    Standing up from a chair using your arms (e.g., wheelchair, bedside chair)? 4  -ANDRES 3  -DA    Climbing 3-5 steps with a railing? 4  -ANDRES 3  -DA    To walk in hospital room? 4  -ANDRES 3  -DA    AM-PAC 6 Clicks Score (PT) 24  -ANDRES 20  -DA    Highest Level of Mobility Goal 8 --> Walked 250 feet or more  -ANDRES 6 --> Walk 10 steps or more  -DA      Row Name 04/04/25 0903 04/04/25 0856       Functional Assessment    Outcome Measure Options AM-PAC 6 Clicks Daily Activity (OT)  -JW AM-PAC 6 Clicks Basic Mobility (PT)  -ANDRES              User Key  (r) = Recorded By, (t) = Taken  By, (c) = Cosigned By      Initials Name Provider Type    Matthew Palacios PT DPT Physical Therapist    Stephanie Dyer OTTAMANNA/L, IDA Occupational Therapist    Kris Sampson, RN Registered Nurse                  Occupational Therapy Education       Title: PT OT SLP Therapies (In Progress)       Topic: Occupational Therapy (In Progress)       Point: ADL training (Done)       Learning Progress Summary            Patient Acceptance, E, VU by  at 4/4/2025 1117                      Point: Precautions (Done)       Learning Progress Summary            Patient Acceptance, E, VU by  at 4/4/2025 1117                                      User Key       Initials Effective Dates Name Provider Type Discipline    LILY 11/15/24 -  Stephanie Irizarry OTR/L, IDA Occupational Therapist OT                  OT Recommendation and Plan  Therapy Frequency (OT): evaluation only  Plan of Care Review  Plan of Care Reviewed With: patient  Outcome Evaluation: OT eval completed. Pt presents alert and oriented x4, sitting up in bed. He reports at baseline being independent with all adls and mobility, residing at home with spouse. Today he was able to complete all bed mobility with Mod I. Independent with lower body dressing. Sup for all sit <> stand t/fs from EOB and ambulated around hallway. No overt neuro deficits identified at this time. OT to sign off as he remains independent. Anticipate d/c home with spouse.  Plan of Care Reviewed With: patient  Outcome Evaluation: OT eval completed. Pt presents alert and oriented x4, sitting up in bed. He reports at baseline being independent with all adls and mobility, residing at home with spouse. Today he was able to complete all bed mobility with Mod I. Independent with lower body dressing. Sup for all sit <> stand t/fs from EOB and ambulated around hallway. No overt neuro deficits identified at this time. OT to sign off as he remains independent. Anticipate d/c home with  spouse.     Time Calculation:         Time Calculation- OT       Row Name 04/04/25 0903             Time Calculation- OT    OT Start Time 0903  -      OT Stop Time 0941  -      OT Time Calculation (min) 38 min  -      OT Received On 04/04/25  -      OT Goal Re-Cert Due Date 04/14/25  -                User Key  (r) = Recorded By, (t) = Taken By, (c) = Cosigned By      Initials Name Provider Type    Stephanie Dyer, LESLIE/L, IDA Occupational Therapist                  Therapy Charges for Today       Code Description Service Date Service Provider Modifiers Qty    04802288009 HC OT EVAL LOW COMPLEXITY 3 4/4/2025 Stephanie Irizarry OTR/L, IDA GO 1               OT Discharge Summary  Anticipated Discharge Disposition (OT): home with assist  Reason for Discharge: At baseline function  Outcomes Achieved: Other (eval x1)  Discharge Destination: Home with assist    LESLIE De La Torre/L, CSRS  4/4/2025

## 2025-04-04 NOTE — CASE MANAGEMENT/SOCIAL WORK
Discharge Planning Assessment   [facility]     Patient Name: Ciro Shaw  MRN: [id number]  Today's Date: 4/4/2025    Admit Date: 4/3/2025        Discharge Needs Assessment       Row Name 04/04/25 1132       Living Environment    People in Home spouse    Name(s) of People in Home Rosalie Bruce    Current Living Arrangements home    Primary Care Provided by self    Provides Primary Care For no one    Family Caregiver if Needed spouse    Family Caregiver Names Rosalie Bruce    Able to Return to Prior Arrangements yes       Resource/Environmental Concerns    Resource/Environmental Concerns none       Transition Planning    Patient/Family Anticipates Transition to home with family    Transportation Anticipated family or friend will provide       Discharge Needs Assessment    Readmission Within the Last 30 Days no previous admission in last 30 days    Equipment Currently Used at Home none    Concerns to be Addressed no discharge needs identified    Equipment Needed After Discharge none    Discharge Coordination/Progress spoke to patient who lives with spouse and is independent at home prior to illness has RX coverage and PCP; will follow for DC needs                   Discharge Plan    No documentation.                      Demographic Summary    No documentation.                  Functional Status    No documentation.                  Psychosocial    No documentation.                  Abuse/Neglect    No documentation.                  Legal    No documentation.                  Substance Abuse    No documentation.                  Patient Forms    No documentation.                     Cassandra Holt RN

## 2025-04-04 NOTE — PLAN OF CARE
Goal Outcome Evaluation:  Plan of Care Reviewed With: patient           Outcome Evaluation: OT eval completed. Pt presents alert and oriented x4, sitting up in bed. He reports at baseline being independent with all adls and mobility, residing at home with spouse. Today he was able to complete all bed mobility with Mod I. Independent with lower body dressing. Sup for all sit <> stand t/fs from EOB and ambulated around hallway. No overt neuro deficits identified at this time. OT to sign off as he remains independent. Anticipate d/c home with spouse.    Anticipated Discharge Disposition (OT): home with assist

## 2025-04-04 NOTE — PLAN OF CARE
Goal Outcome Evaluation:  Plan of Care Reviewed With: patient           Outcome Evaluation: PT eval complete. He was found alert and oriented. Mr. Shaw is hard of hearing and often reads lips. He is  and independent in his mobility and ADL's prior to admit. Today he completes all bed mobility, stance and gait training with supervision from PT. Ambulates 225 ft where he reports being at his baseline mobility. He did not have any further PT needs, questions or concerns. PT to sign off. Recommend d.c home w spouse.    Anticipated Discharge Disposition (PT): home with assist

## 2025-04-04 NOTE — PROGRESS NOTES
Daily Progress Note    ASSESSMENT:   Ciro Shaw is a 64 y.o. male with a significant comorbidity of hypertension. He presents with a new problem of trigeminal neuralgia it has been present for 20 years having previously failed carbamazepine. Physical exam findings of neurologically intact.  His imaging, including MRI of the brain, shows superior cerebellar artery effacing the right trigeminal nerve without evidence of mass lesions or MS.     Past Medical History:   Diagnosis Date    Arthritis Years    Bradycardia     Hypertension 2024    Seizures 2004    Facial    Trigeminal neuralgia      Active Hospital Problems    Diagnosis     **Trigeminal neuralgia      CHIEF COMPLAINT:  Right trigeminal neuralgia    PLAN:   Neuro: Exam at baseline.  No facial pain at present   1 Day Post-Op (4/3/2025) craniotomy for trigeminal neuralgia   Postop CT reviewed, no acute intracranial intracranial blood products, diffuse pneumocephalus   Neuroexams per policy.  Call for decline   DC nonrebreather      CV: Continuous cardiac monitoring.  DC A-line.  No longer requiring Cardene to keep SBP <140  Pulm: Continuous pulse oximetry.  O2.  Maintaining O2 sat.  : Voiding spontaneously.  Bladder scans and I/O cath per policy  FEN: Tolerating regular diet.    GI: BRETT.  Postoperative nausea and vomiting resolved  ID: 23-hour postoperative prophylactic antibiotics complete.  Heme:  DVT prophylaxis, SCDs  Pain: Tolerable at present  Dispo: PT/OT   Transfer to floor today    Subjective  Symptoms stable    Temp:  [97.9 °F (36.6 °C)-99 °F (37.2 °C)] 98 °F (36.7 °C)  Heart Rate:  [58-97] 62  Resp:  [12-26] 12  BP: (115-158)/(63-97) 135/76    Objective:  Vital signs: (most recent): Blood pressure 142/83, pulse 72, temperature 98.2 °F (36.8 °C), temperature source Oral, resp. rate 20, weight 87.2 kg (192 lb 3.9 oz), SpO2 95%.        Neurological Exam  Mental Status  Awake, alert and oriented to person, place and time. Speech is normal. Language  is fluent with no aphasia. Attention and concentration are normal.    Cranial Nerves  CN II: Visual acuity is normal.  CN III, IV, VI: Extraocular movements intact bilaterally. Normal lids and orbits bilaterally. Pupils equal round and reactive to light bilaterally.  CN V: Facial sensation is normal.  CN VII: Full and symmetric facial movement.  CN IX, X: Palate elevates symmetrically  CN XI: Shoulder shrug strength is normal.    Motor  Normal muscle bulk throughout. Normal muscle tone.                                               Right                     Left  Deltoid                                   5                          5   Biceps                                   5                          5   Triceps                                  5                          5   Wrist extensor                       5                          5   Finger flexor                          5                          5   Iliopsoas                               5                          5   Quadriceps                           5                          5   Gastrocnemius                     5                           5   Anterior tibialis                      5                          5  Extensor hallucis longus      5                           5    Sensory  Light touch is normal in upper and lower extremities.     Coordination    Finger-to-nose, rapid alternating movements and heel-to-shin normal bilaterally without dysmetria.    Gait    Did not assess.  Defer to PT.    Scalp incision: C, D, I.    DRAINS:   Urethral Catheter Silicone 16 Fr. (Active)   Daily Indications Required activity restriction from trauma, surgery, (e.g. unstable spine, fracture, hemodynamics) 04/03/25 1226   Collection Container Standard drainage bag 04/03/25 1226   Securement Method Securing device 04/03/25 1226   Output (mL) 150 mL 04/03/25 1141     LAB RESULTS:  ABG:     CBC:   Results from last 7 days   Lab Units 04/04/25  0258 03/31/25  1132    WBC 10*3/mm3 17.25* 7.58   HEMOGLOBIN g/dL 14.5 13.3   HEMATOCRIT % 41.9 38.3   PLATELETS 10*3/mm3 296 281     BMP:  Results from last 7 days   Lab Units 04/04/25  0258 03/31/25  1132   SODIUM mmol/L 134* 132*   POTASSIUM mmol/L 4.4 4.2   CHLORIDE mmol/L 99 96*   CO2 mmol/L 22.0 26.0   BUN mg/dL 13 10   CREATININE mg/dL 0.83 0.79   GLUCOSE mg/dL 137* 99   CALCIUM mg/dL 9.3 8.5*   ALT (SGPT) U/L  --  25     Culture Results:   MRSA Screen Cx   Date Value Ref Range Status   03/31/2025   Final    No Methicillin Resistant Staphylococcus aureus isolated       Imaging Results (Last 24 Hours)       Procedure Component Value Units Date/Time    CT Head Without Contrast [[id number]] Collected: 04/03/25 1301     Updated: 04/03/25 1315    Narrative:      CT HEAD WO CONTRAST- 4/3/2025 11:11 AM     HISTORY: Evaluation status post craniotomy for tumor      COMPARISON: 10/14/2024 MRI brain     TOTAL DOSE LENGTH PRODUCT: 837.98 mGy.cm. Automated exposure control was  also utilized to decrease patient radiation dose.     TECHNIQUE: Axial images of brain obtained without contrast.     FINDINGS: Patient has undergone a right retrosigmoid craniotomy with a  small yeison hole/craniotomy site seen posterior to the right mastoid.  There is diffuse pneumocephalus of the cisterns extending along the  sulci of the bilateral frontal lobes with a single air bubble seen in  the frontal horn of the right lateral ventricle. Nonspecific 4 mm  rounded hyperdensity along the fourth ventricle on series 3 image 10. No  extra-axial hematoma. No acute signs of ischemia.     No air-fluid levels in the paranasal sinuses. Minimal partial  opacification posterior mastoid air cells.       Impression:      1. Right retrosigmoid craniotomy with postoperative pneumocephalus.  Nonspecific 4 mm hyperdensity along the right fourth ventricle. No  extra-axial hematoma.        This report was signed and finalized on 4/3/2025 1:12 PM by Dr. Stephanie Vazquez MD.              Lab Results (last 24 hours)       Procedure Component Value Units Date/Time    Basic Metabolic Panel [[id number]]  (Abnormal) Collected: 04/04/25 0258    Specimen: Blood Updated: 04/04/25 0352     Glucose 137 mg/dL      BUN 13 mg/dL      Creatinine 0.83 mg/dL      Sodium 134 mmol/L      Potassium 4.4 mmol/L      Chloride 99 mmol/L      CO2 22.0 mmol/L      Calcium 9.3 mg/dL      BUN/Creatinine Ratio 15.7     Anion Gap 13.0 mmol/L      eGFR 96.5 mL/min/1.73     Narrative:      GFR Categories in Chronic Kidney Disease (CKD)      GFR Category          GFR (mL/min/1.73)    Interpretation  G1                     90 or greater         Normal or high (1)  G2                      60-89                Mild decrease (1)  G3a                   45-59                Mild to moderate decrease  G3b                   30-44                Moderate to severe decrease  G4                    15-29                Severe decrease  G5                    14 or less           Kidney failure          (1)In the absence of evidence of kidney disease, neither GFR category G1 or G2 fulfill the criteria for CKD.    eGFR calculation 2021 CKD-EPI creatinine equation, which does not include race as a factor    CBC & Differential [[id number]]  (Abnormal) Collected: 04/04/25 0258    Specimen: Blood Updated: 04/04/25 0332    Narrative:      The following orders were created for panel order CBC & Differential.  Procedure                               Abnormality         Status                     ---------                               -----------         ------                     CBC Auto Differential[[id number]]        Abnormal            Final result                 Please view results for these tests on the individual orders.    CBC Auto Differential [[id number]]  (Abnormal) Collected: 04/04/25 0258    Specimen: Blood Updated: 04/04/25 0332     WBC 17.25 10*3/mm3      RBC 4.63 10*6/mm3      Hemoglobin 14.5 g/dL      Hematocrit 41.9 %      MCV 90.5  fL      MCH 31.3 pg      MCHC 34.6 g/dL      RDW 13.2 %      RDW-SD 43.6 fl      MPV 9.0 fL      Platelets 296 10*3/mm3      Neutrophil % 93.6 %      Lymphocyte % 2.7 %      Monocyte % 2.9 %      Eosinophil % 0.1 %      Basophil % 0.1 %      Immature Grans % 0.6 %      Neutrophils, Absolute 16.15 10*3/mm3      Lymphocytes, Absolute 0.46 10*3/mm3      Monocytes, Absolute 0.50 10*3/mm3      Eosinophils, Absolute 0.01 10*3/mm3      Basophils, Absolute 0.02 10*3/mm3      Immature Grans, Absolute 0.11 10*3/mm3      nRBC 0.0 /100 WBC           Zachary Harkins, APRN

## 2025-04-04 NOTE — THERAPY DISCHARGE NOTE
Patient Name: Ciro Shaw  : 1960    MRN: [id number]                              Today's Date: 2025       Admit Date: 4/3/2025    Visit Dx:     ICD-10-CM ICD-9-CM   1. Trigeminal neuralgia  G50.0 350.1     Patient Active Problem List   Diagnosis    Trigeminal neuralgia of right side of face    Essential hypertension    Chronic left shoulder pain    Decreased shoulder mobility, left    Trigeminal neuralgia     Past Medical History:   Diagnosis Date    Arthritis Years    Bradycardia     Hypertension     Seizures 2004    Facial    Trigeminal neuralgia      Past Surgical History:   Procedure Laterality Date    APPENDECTOMY        General Information       Row Name 25 0856          Physical Therapy Time and Intention    Document Type evaluation  R trigeminal neuralgia s/p 4/3 Craniotomy  -ANDRES     Mode of Treatment physical therapy  -ANDRES       Row Name 25 0856          General Information    Patient Profile Reviewed yes  -ANDRES     Prior Level of Function independent:;all household mobility;ADL's  -ANDRES     Existing Precautions/Restrictions no known precautions/restrictions  -ANDRES     Barriers to Rehab medically complex  -ANDRES       Row Name 25 0856          Living Environment    Current Living Arrangements home  -ANDRES     People in Home spouse  -ANDRES       Row Name 25 0856          Cognition    Orientation Status (Cognition) oriented x 4  -ANDRES       Row Name 25 0856          Safety Issues/Impairments Affecting Functional Mobility    Impairments Affecting Function (Mobility) pain  -ANDRES               User Key  (r) = Recorded By, (t) = Taken By, (c) = Cosigned By      Initials Name Provider Type    Matthew Palacios, PT DPT Physical Therapist                   Mobility       Row Name 25 0856          Bed Mobility    Bed Mobility supine-sit  -ANDRES     Supine-Sit [place] (Bed Mobility) supervision  -ANDRES     Assistive Device (Bed Mobility) head of bed elevated  -ANDRES       Row Name  04/04/25 0856          Sit-Stand Transfer    Sit-Stand [place] (Transfers) supervision  -ANDRES       Row Name 04/04/25 0856          Gait/Stairs (Locomotion)    [place] Level (Gait) supervision  -ANDRES     Distance in Feet (Gait) 225  -ANDRES               User Key  (r) = Recorded By, (t) = Taken By, (c) = Cosigned By      Initials Name Provider Type    Matthew Palacios PT DPT Physical Therapist                   Obj/Interventions       Row Name 04/04/25 0856          Range of Motion Comprehensive    General Range of Motion bilateral lower extremity ROM WFL  -ANDRES       Row Name 04/04/25 0856          Strength Comprehensive (MMT)    General Manual Muscle Testing (MMT) Assessment no strength deficits identified  -ANDRES       Row Name 04/04/25 0856          Balance    Balance Assessment sitting dynamic balance;standing dynamic balance  -ANDRES     Dynamic Sitting Balance supervision  -ANDRES     Position, Sitting Balance unsupported;sitting in chair;sitting edge of bed  -ANDRES     Dynamic Standing Balance supervision  -ANDRES     Position/Device Used, Standing Balance unsupported  -ANDRES               User Key  (r) = Recorded By, (t) = Taken By, (c) = Cosigned By      Initials Name Provider Type    Matthew Palacios PT DPT Physical Therapist                   Goals/Plan    No documentation.                  Clinical Impression       Row Name 04/04/25 0856          Pain    Pain Location face;head  -ANDRES     Pain Side/Orientation right  -ANDRES     Pain Management Interventions exercise or physical activity utilized  -ANDRES     Additional Documentation Pain Scale: FACES Pre/Post-Treatment (Group)  -ANDRES       Row Name 04/04/25 0856          Pain Scale: FACES Pre/Post-Treatment    Pain: FACES Scale, Pretreatment 2-->hurts little bit  -ANDRES       Row Name 04/04/25 0856          Plan of Care Review    Plan of Care Reviewed With patient  -ANDRES     Outcome Evaluation PT eval complete. He was found alert and oriented. Mr. Shaw is hard of hearing and  often reads lips. He is  and independent in his mobility and ADL's prior to admit. Today he completes all bed mobility, stance and gait training with supervision from PT. Ambulates 225 ft where he reports being at his baseline mobility. He did not have any further PT needs, questions or concerns. PT to sign off. Recommend d.c home w spouse.  -ANDRES       Row Name 04/04/25 0856          Therapy Assessment/Plan (PT)    Patient/Family Therapy Goals Statement (PT) go home tomorrow.  -ANDRES     Criteria for Skilled Interventions Met (PT) no;does not meet criteria for skilled intervention  -ANDRES     Therapy Frequency (PT) evaluation only  -ANDRES       Row Name 04/04/25 0856          Positioning and Restraints    Pre-Treatment Position in bed  -ANDRES     Post Treatment Position chair  -ANDRES     In Chair notified nsg;reclined;call light within reach;encouraged to call for assist;patient within staff view  -ANDRES               User Key  (r) = Recorded By, (t) = Taken By, (c) = Cosigned By      Initials Name Provider Type    Matthew Palacios, PT DPT Physical Therapist                   Outcome Measures       Row Name 04/04/25 0856 04/04/25 0800       How much help from another person do you currently need...    Turning from your back to your side while in flat bed without using bedrails? 4  -ANDRES 4  -DA    Moving from lying on back to sitting on the side of a flat bed without bedrails? 4  -ANDRES 4  -DA    Moving to and from a bed to a chair (including a wheelchair)? 4  -ANDRES 3  -DA    Standing up from a chair using your arms (e.g., wheelchair, bedside chair)? 4  -ANDRES 3  -DA    Climbing 3-5 steps with a railing? 4  -ANDRES 3  -DA    To walk in hospital room? 4  -ANDRES 3  -DA    AM-PAC 6 Clicks Score (PT) 24  -ANDRES 20  -DA    Highest Level of Mobility Goal 8 --> Walked 250 feet or more  -ANDRES 6 --> Walk 10 steps or more  -DA      Row Name 04/04/25 0856          Functional Assessment    Outcome Measure Options AM-PAC 6 Clicks Basic Mobility (PT)  -ANDRES                User Key  (r) = Recorded By, (t) = Taken By, (c) = Cosigned By      Initials Name Provider Type    Matthew Palacios, PT DPT Physical Therapist    Kris Sampson, RN Registered Nurse                  Physical Therapy Education       Title: PT OT SLP Therapies (Resolved)       Topic: Physical Therapy (Resolved)       Point: Mobility training (Resolved)       Learning Progress Summary            Patient Acceptance, GEOVANI, ENRRIQUE,DU by ANDRES at 4/4/2025 0856    Comment: Benefits of activity, gait safety, 1 time PT eval                                      User Key       Initials Effective Dates Name Provider Type Discipline    ANDRES 02/03/23 -  Matthew Mace, PT DPT Physical Therapist PT                  PT Recommendation and Plan     Outcome Evaluation: PT eval complete. He was found alert and oriented. Mr. Shaw is hard of hearing and often reads lips. He is  and independent in his mobility and ADL's prior to admit. Today he completes all bed mobility, stance and gait training with supervision from PT. Ambulates 225 ft where he reports being at his baseline mobility. He did not have any further PT needs, questions or concerns. PT to sign off. Recommend d.c home w spouse.     Time Calculation:         PT Charges       Row Name 04/04/25 0931             Time Calculation    Start Time 0856  -ANDRES      Stop Time 0931  + 5 minutes w chart review. 39 total minutes  -ANDRES      Time Calculation (min) 35 min  -ANDRES      PT Received On 04/04/25  -ANDRES         Untimed Charges    PT Eval/Re-eval Minutes 39  -ANDRES         Total Minutes    Untimed Charges Total Minutes 39  -ANDRES       Total Minutes 39  -ANDRES                User Key  (r) = Recorded By, (t) = Taken By, (c) = Cosigned By      Initials Name Provider Type    Matthew Palacios, PT DPT Physical Therapist                  Therapy Charges for Today       Code Description Service Date Service Provider Modifiers Qty    76772507028 HC PT EVAL LOW COMPLEXITY 3 4/4/2025  Rodri, Matthew MAO, PT DPT GP 1            PT G-Codes  Outcome Measure Options: AM-PAC 6 Clicks Basic Mobility (PT)  AM-PAC 6 Clicks Score (PT): 24    PT Discharge Summary  Anticipated Discharge Disposition (PT): home with assist    Matthew Mace, PT DPT  4/4/2025

## 2025-04-05 ENCOUNTER — READMISSION MANAGEMENT (OUTPATIENT)
Dept: CALL CENTER | Facility: HOSPITAL | Age: 65
End: 2025-04-05
Payer: MEDICARE

## 2025-04-05 VITALS
DIASTOLIC BLOOD PRESSURE: 79 MMHG | SYSTOLIC BLOOD PRESSURE: 128 MMHG | RESPIRATION RATE: 18 BRPM | WEIGHT: 192.24 LBS | TEMPERATURE: 98.6 F | BODY MASS INDEX: 25.48 KG/M2 | HEART RATE: 79 BPM | OXYGEN SATURATION: 95 %

## 2025-04-05 LAB
ANION GAP SERPL CALCULATED.3IONS-SCNC: 10 MMOL/L (ref 5–15)
BASOPHILS # BLD AUTO: 0.03 10*3/MM3 (ref 0–0.2)
BASOPHILS NFR BLD AUTO: 0.2 % (ref 0–1.5)
BUN SERPL-MCNC: 28 MG/DL (ref 8–23)
BUN/CREAT SERPL: 32.6 (ref 7–25)
CALCIUM SPEC-SCNC: 9 MG/DL (ref 8.6–10.5)
CHLORIDE SERPL-SCNC: 101 MMOL/L (ref 98–107)
CO2 SERPL-SCNC: 23 MMOL/L (ref 22–29)
CREAT SERPL-MCNC: 0.86 MG/DL (ref 0.76–1.27)
DEPRECATED RDW RBC AUTO: 46.6 FL (ref 37–54)
EGFRCR SERPLBLD CKD-EPI 2021: 95.5 ML/MIN/1.73
EOSINOPHIL # BLD AUTO: 0.02 10*3/MM3 (ref 0–0.4)
EOSINOPHIL NFR BLD AUTO: 0.1 % (ref 0.3–6.2)
ERYTHROCYTE [DISTWIDTH] IN BLOOD BY AUTOMATED COUNT: 13.8 % (ref 12.3–15.4)
GLUCOSE SERPL-MCNC: 100 MG/DL (ref 65–99)
HCT VFR BLD AUTO: 40.1 % (ref 37.5–51)
HGB BLD-MCNC: 13.7 G/DL (ref 13–17.7)
IMM GRANULOCYTES # BLD AUTO: 0.12 10*3/MM3 (ref 0–0.05)
IMM GRANULOCYTES NFR BLD AUTO: 0.6 % (ref 0–0.5)
LYMPHOCYTES # BLD AUTO: 2.38 10*3/MM3 (ref 0.7–3.1)
LYMPHOCYTES NFR BLD AUTO: 12.2 % (ref 19.6–45.3)
MCH RBC QN AUTO: 31.6 PG (ref 26.6–33)
MCHC RBC AUTO-ENTMCNC: 34.2 G/DL (ref 31.5–35.7)
MCV RBC AUTO: 92.4 FL (ref 79–97)
MONOCYTES # BLD AUTO: 1.95 10*3/MM3 (ref 0.1–0.9)
MONOCYTES NFR BLD AUTO: 10 % (ref 5–12)
NEUTROPHILS NFR BLD AUTO: 15 10*3/MM3 (ref 1.7–7)
NEUTROPHILS NFR BLD AUTO: 76.9 % (ref 42.7–76)
NRBC BLD AUTO-RTO: 0 /100 WBC (ref 0–0.2)
PLATELET # BLD AUTO: 275 10*3/MM3 (ref 140–450)
PMV BLD AUTO: 9.5 FL (ref 6–12)
POTASSIUM SERPL-SCNC: 4.1 MMOL/L (ref 3.5–5.2)
RBC # BLD AUTO: 4.34 10*6/MM3 (ref 4.14–5.8)
SODIUM SERPL-SCNC: 134 MMOL/L (ref 136–145)
WBC NRBC COR # BLD AUTO: 19.5 10*3/MM3 (ref 3.4–10.8)

## 2025-04-05 PROCEDURE — 85025 COMPLETE CBC W/AUTO DIFF WBC: CPT | Performed by: NURSE PRACTITIONER

## 2025-04-05 PROCEDURE — 25010000002 HEPARIN (PORCINE) PER 1000 UNITS: Performed by: NURSE PRACTITIONER

## 2025-04-05 PROCEDURE — 80048 BASIC METABOLIC PNL TOTAL CA: CPT | Performed by: NURSE PRACTITIONER

## 2025-04-05 PROCEDURE — 25010000002 HYDRALAZINE PER 20 MG: Performed by: NURSE PRACTITIONER

## 2025-04-05 RX ORDER — OXCARBAZEPINE 600 MG/1
300 TABLET, FILM COATED ORAL 2 TIMES DAILY
Qty: 60 TABLET | Refills: 2 | Status: SHIPPED | OUTPATIENT
Start: 2025-04-05 | End: 2025-10-02

## 2025-04-05 RX ADMIN — LISINOPRIL 40 MG: 20 TABLET ORAL at 09:41

## 2025-04-05 RX ADMIN — HYDRALAZINE HYDROCHLORIDE 10 MG: 20 INJECTION INTRAMUSCULAR; INTRAVENOUS at 04:33

## 2025-04-05 RX ADMIN — HEPARIN SODIUM 5000 UNITS: 5000 INJECTION, SOLUTION INTRAVENOUS; SUBCUTANEOUS at 09:41

## 2025-04-05 RX ADMIN — Medication 1 APPLICATION: at 09:41

## 2025-04-05 RX ADMIN — ACETAMINOPHEN 650 MG: 325 TABLET, FILM COATED ORAL at 07:36

## 2025-04-05 NOTE — PLAN OF CARE
Goal Outcome Evaluation:              Outcome Evaluation: AOx4. VSS on RA. Surgical incision to scalp behind right ear SPENCER, sutures intact, scant bloody drainage. patient up ad shantell in room with steady gait. voiding per BR, HOB up at least 30 degrees. Pt currently up in chair resting comfortably. Pt to be discharged home today. safety maintained. call light within reach.

## 2025-04-05 NOTE — PLAN OF CARE
Goal Outcome Evaluation:  Plan of Care Reviewed With: patient        Progress: improving  Outcome Evaluation: A & O x 4, VSS, surgical wound behind R ear with sutures intact and scant bloody drainage, patient up ad shantell in room with steady gait, c/o mild pain, prn tylenol given with relief, voiding per BR, HOB up 30%, safety maintained

## 2025-04-05 NOTE — OP NOTE
NEUROSURGERY OPERATIVE NOTE    Ciro Shaw  OR Date: 4/3/2025    Pre-op Diagnosis:   Trigeminal neuralgia [G50.0]    Post-op Diagnosis:     Post-Op Diagnosis Codes:     * Trigeminal neuralgia [G50.0]    Surgeon(s):  Dae Shelton MD    Anesthesia: General    Staff:   Circulator: Chris Wooten RN  Scrub Person: LibanDomenica; Alix Baxter  Vendor Representative: Lisa Hollis    Procedure(s):  CRANIOTOMY RETROSIGMOID for microvascular decompression for trigeminal neuralgia, right  Use of intraoperative microscope  Use of intraoperative neuro monitoring including cranial nerve monitoring.     NDICATIONS FOR PROCEDURE:   Ciro Shaw is a 64 y.o. male with a significant comorbidity of hypertension. He presents with a new problem of trigeminal neuralgia it has been present for 20 years having previously failed carbamazepine. Physical exam findings of neurologically intact.  His imaging, including MRI of the brain, shows superior cerebellar artery effacing the right trigeminal nerve without evidence of mass lesions or MS.     Right trigeminal neuralgia     I discussed the risks of a  right sided retrosigmoid craniotomy with neuromonitoring, including but not limited to infection, bleeding, damage to local structures, CSF leak, seizures, hydrocephalus, weakness, numbness, paralysis, cerebellar mutism or loss of bowel, and bladder function, stroke, coma, MI, or death.    DESCRIPTION OF OPERATION AND FINDINGS:   On the day of surgery, the patient was brought to the preoperative holding area where IV access was obtained. Prophylactic intravenous antibiotics were administered. The patient was then brought to the major operative suite. While on the [facility], the patient underwent an uneventful induction of general anesthetic with placement of endotracheal tube. TEDs, SCD hoses, and a Pickett catheter were applied.      Positioning - The patient was placed in the left lateral park bench position on  a standard operating table.  All pressure points were inspected and appropriately padded, and he was secured to the table.  The patient was placed in a [place] 3-point fixation head oneill to at least 60lbs.      Baseline neuro monitoring including EMG, SSEP, and cranial nerve monitoring was performed    Retrosigmoid Craniotomy  The hair was then clipped behind the right ear.  The occipital protuberance was palpated and a line connecting it to external acoustic meatus was made to approximate the transverse sinus.  Next a line was draw approximating the sigmoid sinus was made 2 finger breadths behind the ear towards the digastric groove.  A curvilinear incision was marked 2 finger breadths behind the right ear over the sigmoid sinus and confirmed with navigation.  The scalp was prepped with betadine and DuraPrep and allowed to dry for 5 minutes.  The patient was then draped in the usual fashion.  At this point a WHO surgical timeout was performed with all members of the surgical team.      The skin was infiltrated with quarter percent Marcaine with epinephrine.  The skin was incised with a #10 scalpel.  A cutaneous flap was then elevated towards the ear using the Bovie and fan-shaped periosteal leaving the fascia intact.  The asterion was noted and the emissary veins were waxed.  Cerebellar retractors were then readjusted.  A single yeison hole was made with a 4mm round yeison. This was enlarged until the transverse sinus and sigmoid sinus was identified.  The dura was then opened towards the transverse and sigmoid sinus with a 15 blade and Metzenbaum scissors and tacked up with Nurolon sutures.       We began by placing a small cottonoid near the foramen magnum to wick off CSF.  The operating microscope was then brought into the field.  Hu retractor was attached to the [place] head oneill and a 1 inch malleable retractor was placed into the tentorial mastoid corner.  This was then used to gently retract the  cerebellum.  Superior petrosal vein was then noted in the corner.  This was coagulated with bipolar cautery and then carefully cut.  The retractor was then further advanced.  Arachnoid was dissected using a Rhoton nerve hook and the 5th cranial nerve came into view.  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  This was gently elevated and held in place with a sucker while 2 pieces of tufted Denis pledgets were placed.  Once his suction was removed the artery was held away from the nerve.    No alterations in either 7th or 8th cranial nerve monitoring was noted during the procedure. Although there were no BAERs due to hearing loss at baseline bilaterally.     The cavity was then filled with irrigated multiple times. Once meticulous hemostasis.  Small piece of thrombin-soaked Gelfoam was placed underneath the dura.  The dura was closed in a with 4-0 Nurolon sutures and a small piece of Surgicel and then thrombin-soaked Gelfoam were placed in the bur hole.  DuraSeal was placed over this layer.  The bur hole defect was then covered with titanium plate system and secured. The muscle fascia galea was closed with interrupted 2-0 Vicryl sutures.  4-0 Monocryl was used for skin closure.  Nylon retention sutures were placed along the length of the incision.  Incision was dressed with bacitracin ointment.    The patient was then removed from the [place] head oneill.  All pin sites were inspected for bleeding.  The patient was awoken and found to be at his neurological baseline.    The blood loss of the operation was about 20 mL. There were no complications of the surgery per se. The needle count, sponge count, and the cottonoid count were correct.      Estimated Blood Loss: 20 ml    Complications: None    Implants:   Implant Name Type Inv. Item Serial No.  Lot No. LRB No. Used Action    HEMOST ABS SURGICEL ORIG 4X8IN STRL - [id number] Implant HEMOST ABS SURGICEL ORIG 4X8IN STRL  [employer]  DIV [employer] J [employer] J [id number] Right 1 Implanted   HEMOST ABS SURGIFOAM  8X12 10MM - [id number] Implant HEMOST ABS SURGIFOAM  8X12 10MM  [employer]  DIV OF J AND J [id number] Right 1 Implanted   KT HEMOST ABS SURGIFOAM PORCN 1GRAM - [id number] Implant KT HEMOST ABS SURGIFOAM PORCN 1GRAM  [employer]  DIV OF J AND J [id number] Right 1 Implanted   SEAL DURL ADHERUS/AUTOSPRAY HYDROGEL DS - [id number] Implant SEAL DURL ADHERUS/AUTOSPRAY HYDROGEL DS  benchee KOREY [id number] Right 1 Implanted   DURALMATRIX DURAGEN PLS 1X3IN EA/5 - [id number] Implant DURALMATRIX DURAGEN PLS 1X3IN EA/5  INTEGRA [id number] Right 1 Implanted   MESH NEURO LEFORTE DYN 3DIMEN [id number] SLVR - [id number] Implant MESH NEURO LEFORTE DYN 3DIMEN [id number] SLVR  JTS SURGICAL NR Right 1 Implanted   SCRW PLT NEURO LEFORTE L/P SLF/DRL 1.4X3.7MM SLVR - [id number] Implant SCRW PLT NEURO LEFORTE L/P SLF/DRL 1.4X3.7MM SLVR  JTS SURGICAL NR Right 7 Implanted       Specimens:                None      Drains:   [REMOVED] Urethral Catheter Silicone 16 Fr. (Removed)   Daily Indications Required activity restriction from trauma, surgery, (e.g. unstable spine, fracture, hemodynamics) 04/04/25 0400   Site Assessment Clean;Skin intact 04/04/25 0000   Collection Container Standard drainage bag 04/04/25 0400   Securement Method Securing device 04/04/25 0400   Catheter care complete Yes 04/03/25 2000   Output (mL) 1400 mL 04/04/25 0400

## 2025-04-05 NOTE — DISCHARGE SUMMARY
Date of Discharge:  4/5/2025    Discharge Diagnosis:   1. Trigeminal neuralgia        Presenting Problem/History of Present Illness  Trigeminal neuralgia [G50.0]   1. Trigeminal neuralgia        Hospital Course  Ciro Shaw is a 64 y.o. male with a significant comorbidity of hypertension. He presents with a new problem of trigeminal neuralgia it has been present for 20 years having previously failed carbamazepine. Physical exam findings of neurologically intact.  His imaging, including MRI of the brain, shows superior cerebellar artery effacing the right trigeminal nerve without evidence of mass lesions or MS.     Right trigeminal neuralgia    Patient was initially evaluated in clinic and after discussing the risk benefits and possible complications of surgery Ciro was brought to the main operating room for right microvascular decompression on 4/3/2025.  Postoperatively he did well.  His neuro exam intact with no evidence of facial pain.  His pain was controlled on oral medication, they were evaluated by Physical Therapy and Occupational Therapy and deemed appropriate for discharge home.  At discharge the patient was able to ambulate independently.  Postoperative education was performed at bedside by myself which included wound care instructions, maximal physical activity, use of postoperative pain medication including tapering, and indications for contacting my office or the emergency room.  Arrangements for postoperative follow-up in my clinic with a wound check in 2 weeks with my nurse practitioner and at 6 weeks by myself.      Procedures Performed  Procedure(s):  CRANIOTOMY RETROSIGMOID for trigeminal neuralgia, right       Consults:   Consults       No orders found from 3/5/2025 to 4/4/2025.            Pertinent Test Results:   CT Head Without Contrast  Result Date: 4/3/2025  CT HEAD WO CONTRAST- 4/3/2025 11:11 AM  HISTORY: Evaluation status post craniotomy for tumor  COMPARISON: 10/14/2024 MRI brain   TOTAL DOSE LENGTH PRODUCT: 837.98 mGy.cm. Automated exposure control was also utilized to decrease patient radiation dose.  TECHNIQUE: Axial images of brain obtained without contrast.  FINDINGS: Patient has undergone a right retrosigmoid craniotomy with a small yeison hole/craniotomy site seen posterior to the right mastoid. There is diffuse pneumocephalus of the cisterns extending along the sulci of the bilateral frontal lobes with a single air bubble seen in the frontal horn of the right lateral ventricle. Nonspecific 4 mm rounded hyperdensity along the fourth ventricle on series 3 image 10. No extra-axial hematoma. No acute signs of ischemia.  No air-fluid levels in the paranasal sinuses. Minimal partial opacification posterior mastoid air cells.      Impression: 1. Right retrosigmoid craniotomy with postoperative pneumocephalus. Nonspecific 4 mm hyperdensity along the right fourth ventricle. No extra-axial hematoma.   This report was signed and finalized on 4/3/2025 1:12 PM by Dr. Stephanie Vazquez MD.                 Lab Results (last 24 hours)       Procedure Component Value Units Date/Time    Basic Metabolic Panel [[id number]]  (Abnormal) Collected: 04/05/25 0449    Specimen: Blood Updated: 04/05/25 0553     Glucose 100 mg/dL      BUN 28 mg/dL      Creatinine 0.86 mg/dL      Sodium 134 mmol/L      Potassium 4.1 mmol/L      Chloride 101 mmol/L      CO2 23.0 mmol/L      Calcium 9.0 mg/dL      BUN/Creatinine Ratio 32.6     Anion Gap 10.0 mmol/L      eGFR 95.5 mL/min/1.73     Narrative:      GFR Categories in Chronic Kidney Disease (CKD)      GFR Category          GFR (mL/min/1.73)    Interpretation  G1                     90 or greater         Normal or high (1)  G2                      60-89                Mild decrease (1)  G3a                   45-59                Mild to moderate decrease  G3b                   30-44                Moderate to severe decrease  G4                    15-29                Severe  decrease  G5                    14 or less           Kidney failure          (1)In the absence of evidence of kidney disease, neither GFR category G1 or G2 fulfill the criteria for CKD.    eGFR calculation 2021 CKD-EPI creatinine equation, which does not include race as a factor    CBC & Differential [[id number]]  (Abnormal) Collected: 04/05/25 0449    Specimen: Blood Updated: 04/05/25 0535    Narrative:      The following orders were created for panel order CBC & Differential.  Procedure                               Abnormality         Status                     ---------                               -----------         ------                     CBC Auto Differential[[id number]]        Abnormal            Final result                 Please view results for these tests on the individual orders.    CBC Auto Differential [[id number]]  (Abnormal) Collected: 04/05/25 0449    Specimen: Blood Updated: 04/05/25 0535     WBC 19.50 10*3/mm3      RBC 4.34 10*6/mm3      Hemoglobin 13.7 g/dL      Hematocrit 40.1 %      MCV 92.4 fL      MCH 31.6 pg      MCHC 34.2 g/dL      RDW 13.8 %      RDW-SD 46.6 fl      MPV 9.5 fL      Platelets 275 10*3/mm3      Neutrophil % 76.9 %      Lymphocyte % 12.2 %      Monocyte % 10.0 %      Eosinophil % 0.1 %      Basophil % 0.2 %      Immature Grans % 0.6 %      Neutrophils, Absolute 15.00 10*3/mm3      Lymphocytes, Absolute 2.38 10*3/mm3      Monocytes, Absolute 1.95 10*3/mm3      Eosinophils, Absolute 0.02 10*3/mm3      Basophils, Absolute 0.03 10*3/mm3      Immature Grans, Absolute 0.12 10*3/mm3      nRBC 0.0 /100 WBC             Condition on Discharge:  stable    Vital Signs  Temp:  [97.8 °F (36.6 °C)-98.6 °F (37 °C)] 98.6 °F (37 °C)  Heart Rate:  [71-87] 79  Resp:  [14-18] 18  BP: (128-152)/(72-87) 128/79    Physical Exam:   Physical Exam  Eyes:      General: Lids are normal.      Extraocular Movements: Extraocular movements intact.      Pupils: Pupils are equal, round, and reactive to  light.   Neurological:      Coordination: Coordination is intact.      Deep Tendon Reflexes:      Reflex Scores:       Tricep reflexes are 2+ on the right side and 2+ on the left side.       Bicep reflexes are 2+ on the right side and 2+ on the left side.       Brachioradialis reflexes are 2+ on the right side and 2+ on the left side.       Patellar reflexes are 2+ on the right side and 2+ on the left side.       Achilles reflexes are 2+ on the right side and 2+ on the left side.  Psychiatric:         Speech: Speech normal.          Neurological Exam  Mental Status  Awake, alert and oriented to person, place and time. Speech is normal. Language is fluent with no aphasia. Attention and concentration are normal.    Cranial Nerves  CN II: Visual acuity is normal.  CN III, IV, VI: Extraocular movements intact bilaterally. Normal lids and orbits bilaterally. Pupils equal round and reactive to light bilaterally.  CN V: Facial sensation is normal.  CN VII: Full and symmetric facial movement.  CN IX, X: Palate elevates symmetrically  CN XI: Shoulder shrug strength is normal.    Motor  Normal muscle bulk throughout. Normal muscle tone.                                               Right                     Left  Deltoid                                   5                          5   Biceps                                   5                          5   Triceps                                  5                          5   Wrist extensor                       5                          5   Finger flexor                          5                          5   Iliopsoas                               5                          5   Quadriceps                           5                          5   Gastrocnemius                     5                           5   Anterior tibialis                      5                          5  Extensor hallucis longus      5                           5    Sensory  Light touch is normal in  upper and lower extremities.     Reflexes                                            Right                      Left  Brachioradialis                    2+                         2+  Biceps                                 2+                         2+  Triceps                                2+                         2+  Patellar                                2+                         2+  Achilles                                2+                         2+  Right Plantar: downgoing  Left Plantar: downgoing    Right pathological reflexes: Brandon's absent. Ankle clonus absent.  Left pathological reflexes: Brandon's absent. Ankle clonus absent.    Coordination    Finger-to-nose, rapid alternating movements and heel-to-shin normal bilaterally without dysmetria.    Gait  Casual gait is normal including stance, stride, and arm swing.      Discharge Disposition  Home or Self Care    Discharge Medications     Discharge Medications        Changes to Medications        Instructions Start Date   OXcarbazepine 600 MG tablet  Commonly known as: TRILEPTAL  What changed: how much to take   300 mg, Oral, 2 Times Daily             Continue These Medications        Instructions Start Date   acyclovir 400 MG tablet  Commonly known as: ZOVIRAX   800 mg, Oral, Daily, Take no more than 5 doses a day.      cloNIDine 0.1 MG tablet  Commonly known as: CATAPRES   0.1 mg, Oral, 3 Times Daily PRN      dilTIAZem  MG 24 hr capsule  Commonly known as: DILACOR XR   180 mg, Oral, Every Evening      lisinopril 40 MG tablet  Commonly known as: PRINIVIL,ZESTRIL   40 mg, Oral, Daily      multivitamin with minerals tablet tablet   1 tablet, Oral, Daily      promethazine 25 MG tablet  Commonly known as: PHENERGAN   25 mg, Oral, Every 8 Hours PRN               Discharge Diet:  as tolerated    Activity at Discharge:  no heavy lifting (more than 8 lbs or a gallon of milk)    Follow-up Appointments  Future Appointments   Date Time Provider  Department Center   4/17/2025  9:00 AM Zachary Harkins APRN [facility]   5/12/2025 11:45 AM Dae Shelton MD [facility]         Test Results Pending at Discharge       Dae Shelton MD  04/05/25  09:24 CDT    Time: Discharge 30 min

## 2025-04-05 NOTE — OUTREACH NOTE
Prep Survey      Flowsheet Row Responses   [facility] patient discharged from? [place]   Is LACE score < 7 ? Yes   Eligibility [facility]   Date of Admission 04/03/25   Date of Discharge 04/05/25   Discharge Disposition Home or Self Care   Discharge diagnosis Trigeminal neuralgia, CRANIOTOMY RETROSIGMOID for trigeminal neuralgia, right   Does the patient have one of the following disease processes/diagnoses(primary or secondary)? General Surgery   Does the patient have Home health ordered? No   Is there a DME ordered? No   Prep survey completed? Yes            KRYSTIN MAO - Registered Nurse

## 2025-04-05 NOTE — PLAN OF CARE
Goal Outcome Evaluation:   Pt A/O x4. VSS on RA. Incision to R head behind ear, approximated. Open to air. Voiding/continent. Heparin. Up SBA. IV in R and L hand, C/D/I. C/o pain, prn tylenol given. Pt also enjoys a cool moist towel on his forehead to help with headache. Possible d/c home tomorrow. Safety maintained. Call light in reach.

## 2025-04-07 ENCOUNTER — TRANSITIONAL CARE MANAGEMENT TELEPHONE ENCOUNTER (OUTPATIENT)
Dept: CALL CENTER | Facility: HOSPITAL | Age: 65
End: 2025-04-07
Payer: MEDICARE

## 2025-04-07 NOTE — OUTREACH NOTE
Call Center TCM Note      Flowsheet Row Responses   [facility], [place], operated by [facility] patient discharged from? [place]   Does the patient have one of the following disease processes/diagnoses(primary or secondary)? General Surgery   TCM attempt successful? Yes   Call start time [id number]   Call end time [phone]   Discharge diagnosis Trigeminal neuralgia, CRANIOTOMY RETROSIGMOID for trigeminal neuralgia, right   Person spoke with today (if not patient) and relationship Rosalie Allen/Spouse   Meds reviewed with patient/caregiver? Yes   Is the patient having any side effects they believe may be caused by any medication additions or changes? No   Does the patient have all medications related to this admission filled (includes all antibiotics, pain medications, etc.) Yes   Is the patient taking all medications as directed (includes completed medication regime)? Yes   Comments Has appt on 4/17 to have staples removed. Then f/u on 5/12 with neuro   Does the patient have an appointment with their PCP within 7-14 days of discharge? No   Nursing Interventions Patient desires to follow up with specialty only   Psychosocial issues? No   Did the patient receive a copy of their discharge instructions? Yes   Nursing interventions Reviewed instructions with patient   What is the patient's perception of their health status since discharge? Improving   Nursing interventions Nurse provided patient education   Is the patient /caregiver able to teach back basic post-op care? Practice 'cough and deep breath', Keep incision areas clean,dry and protected   Is the patient/caregiver able to teach back signs and symptoms of incisional infection? Fever, Pus or odor from incision, Incisional warmth, Increased drainage or bleeding, Increased redness, swelling or pain at the incisonal site   Is the patient/caregiver able to teach back steps to recovery at home? Rest and rebuild strength, gradually increase activity, Set small, achievable goals for return to baseline health,  Eat a well-balance diet   Is the patient/caregiver able to teach back the hierarchy of who to call/visit for symptoms/problems? PCP, Specialist, Home health nurse, Urgent Care, ED, 911 Yes   Additional teach back comments Wife states she is a nurse and she knows what to look for.   TCM call completed? Yes   Wrap up additional comments No questions or needs at this time.   Call end time [phone]            Olga HANCOCK - Licensed Nurse    4/7/2025, 09:54 CDT

## 2025-04-10 ENCOUNTER — OFFICE VISIT (OUTPATIENT)
Dept: FAMILY MEDICINE CLINIC | Facility: CLINIC | Age: 65
End: 2025-04-10
Payer: MEDICARE

## 2025-04-10 VITALS
TEMPERATURE: 99.1 F | DIASTOLIC BLOOD PRESSURE: 93 MMHG | OXYGEN SATURATION: 97 % | WEIGHT: 186.6 LBS | SYSTOLIC BLOOD PRESSURE: 153 MMHG | BODY MASS INDEX: 24.73 KG/M2 | HEART RATE: 73 BPM | HEIGHT: 73 IN | RESPIRATION RATE: 18 BRPM

## 2025-04-10 DIAGNOSIS — I10 ESSENTIAL HYPERTENSION: ICD-10-CM

## 2025-04-10 DIAGNOSIS — G50.0 TRIGEMINAL NEURALGIA: ICD-10-CM

## 2025-04-10 DIAGNOSIS — I10 UNCONTROLLED HYPERTENSION: ICD-10-CM

## 2025-04-10 DIAGNOSIS — I10 ESSENTIAL HYPERTENSION: Primary | ICD-10-CM

## 2025-04-10 RX ORDER — DILTIAZEM HYDROCHLORIDE 180 MG/1
360 CAPSULE, EXTENDED RELEASE ORAL EVERY EVENING
Start: 2025-04-10

## 2025-04-10 RX ORDER — HYDROCHLOROTHIAZIDE 12.5 MG/1
12.5 TABLET ORAL DAILY
Qty: 30 TABLET | Refills: 0 | Status: SHIPPED | OUTPATIENT
Start: 2025-04-10

## 2025-04-10 NOTE — PROGRESS NOTES
"Chief Complaint   Patient presents with    Hypertension     Pt presents for hypertension today.         Subjective   Ciro Shaw is a 64 y.o. male who presents today for the following:    HPI   TCM-   Discharge diagnosis-Trigeminal Neuralgia and craniotomy retrosigmoid   Labs and imaging reviewed.   Follow up appointments with Neuro are on 04/17/25 and 05/12/25.   Only medication changed was oxcarbazepine dosage.       HTN- This morning 163/113 at home. He is currently on lisinopril 40mg. He also has clonidine 0.1 as needed. He took his clonidine this morning. Patient check blood pressure multiple times a day.     Allergies   Allergen Reactions    Sulfa Antibiotics Unknown - Low Severity     Family hx --has not ever attempted sulfa abx.         OBJECTIVE:  Vitals:    04/10/25 1255 04/10/25 1304   BP: 151/98 153/93   BP Location: Left arm Right arm   Patient Position: Sitting Sitting   Cuff Size: Adult Adult   Pulse: 73    Resp: 18    Temp: 99.1 °F (37.3 °C)    TempSrc: Temporal    SpO2: 97%    Weight: 84.6 kg (186 lb 9.6 oz)    Height: 185 cm (72.84\")      Physical Exam  Vitals and nursing note reviewed.   Constitutional:       Appearance: Normal appearance.   HENT:      Head:     Cardiovascular:      Rate and Rhythm: Normal rate and regular rhythm.   Pulmonary:      Effort: Pulmonary effort is normal.      Breath sounds: Normal breath sounds. No wheezing or rhonchi.   Neurological:      Mental Status: He is alert.   Psychiatric:         Mood and Affect: Mood normal.         Behavior: Behavior normal.         BMI is within normal parameters. No other follow-up for BMI required.          ASSESSMENT/ PLAN:  Assessment & Plan  Essential hypertension      Orders:    hydroCHLOROthiazide 12.5 MG tablet; Take 1 tablet by mouth Daily.    Trigeminal neuralgia  Continue care with specialist.             Procedures     Management Plan:     An After Visit Summary was printed and given to the patient at " discharge.    Follow-up: Return in about 2 weeks (around 4/24/2025) for HTN.         Gabrielle Arguello, FRANCO 4/10/2025 15:59 CDT  This note was electronically signed.

## 2025-04-17 ENCOUNTER — OFFICE VISIT (OUTPATIENT)
Dept: NEUROSURGERY | Facility: CLINIC | Age: 65
End: 2025-04-17
Payer: MEDICARE

## 2025-04-17 VITALS — WEIGHT: 186 LBS | HEIGHT: 73 IN | BODY MASS INDEX: 24.65 KG/M2

## 2025-04-17 DIAGNOSIS — G50.0 TRIGEMINAL NEURALGIA: Primary | ICD-10-CM

## 2025-04-17 DIAGNOSIS — T14.8XXA WOUND DRAINAGE: ICD-10-CM

## 2025-04-17 DIAGNOSIS — Z98.890 S/P CRANIOTOMY: ICD-10-CM

## 2025-04-17 PROCEDURE — 1159F MED LIST DOCD IN RCRD: CPT | Performed by: NURSE PRACTITIONER

## 2025-04-17 PROCEDURE — 1160F RVW MEDS BY RX/DR IN RCRD: CPT | Performed by: NURSE PRACTITIONER

## 2025-04-17 PROCEDURE — 99024 POSTOP FOLLOW-UP VISIT: CPT | Performed by: NURSE PRACTITIONER

## 2025-04-17 RX ORDER — CEPHALEXIN 500 MG/1
500 CAPSULE ORAL 2 TIMES DAILY
Qty: 20 CAPSULE | Refills: 0 | Status: SHIPPED | OUTPATIENT
Start: 2025-04-17 | End: 2025-04-27

## 2025-04-17 NOTE — PATIENT INSTRUCTIONS
Advance Care Planning and Advance Directives     You make decisions on a daily basis - decisions about where you want to live, your career, your home, your life. Perhaps one of the most important decisions you face is your choice for future medical care. Take time to talk with your family and your healthcare team and start planning today.  Advance Care Planning is a process that can help you:  Understand possible future healthcare decisions in light of your own experiences  Reflect on those decision in light of your goals and values  Discuss your decisions with those closest to you and the healthcare professionals that care for you  Make a plan by creating a document that reflects your wishes    Surrogate Decision Maker  In the event of a medical emergency, which has left you unable to communicate or to make your own decisions, you would need someone to make decisions for you.  It is important to discuss your preferences for medical treatment with this person while you are in good health.     Qualities of a surrogate decision maker:  Willing to take on this role and responsibility  Knows what you want for future medical care  Willing to follow your wishes even if they don't agree with them  Able to make difficult medical decisions under stressful circumstances    Advance Directives  These are legal documents you can create that will guide your healthcare team and decision maker(s) when needed. These documents can be stored in the electronic medical record.    Living Will - a legal document to guide your care if you have a terminal condition or a serious illness and are unable to communicate. States vary by statute in document names/types, but most forms may include one or more of the following:        -  Directions regarding life-prolonging treatments        -  Directions regarding artificially provided nutrition/hydration        -  Choosing a healthcare decision maker        -  Direction regarding organ/tissue  donation    Durable Power of  for Healthcare - this document names an -in-fact to make medical decisions for you, but it may also allow this person to make personal and financial decisions for you. Please seek the advice of an  if you need this type of document.    **Advance Directives are not required and no one may discriminate against you if you do not sign one.    Medical Orders  Many states allow specific forms/orders signed by your physician to record your wishes for medical treatment in your current state of health. This form, signed in personal communication with your physician, addresses resuscitation and other medical interventions that you may or may not want.      For more information or to schedule a time with a [facility] Advance Care Planning Facilitator contact: [email or web address] or call [phone] and someone will contact you directly.

## 2025-04-17 NOTE — PROGRESS NOTES
Chief complaint:   Chief Complaint   Patient presents with    Post-op     Pt is here for PO visit.      Subjective     HPI:   Ciro Shaw    History of Present Illness  The patient is a 64-year-old male who presents today for continued evaluation following a right retrosigmoid craniotomy for trigeminal neuralgia on 04/03/2025.    He reports no systemic symptoms such as fevers or chills. He has experienced a resolution of his facial pain but notes persistent mild facial numbness without areas of facial weakness.  Initially, he was unable to perceive the sensation of flossing his teeth, but this has since improved. He estimates that his sensory recovery is approximately three-quarters complete. He does not experience any facial weakness. His Trileptal dosage was halved postoperatively, and he expresses a belief that he may not require this medication.  He denies fevers, chills, or concern for postoperative incision infection, however admits to not cleaning the incision due to concern for pulling out his sutures.  He currently rates the severity of his symptoms 0/10.    PFSH:  Past Medical History:   Diagnosis Date    Arthritis Years    Bradycardia     Hypertension 2024    Seizures 2004    Facial    Trigeminal neuralgia      Past Surgical History:   Procedure Laterality Date    APPENDECTOMY      CRANIOTOMY Right 4/3/2025    Procedure: CRANIOTOMY RETROSIGMOID for trigeminal neuralgia, right;  Surgeon: Dae Shelton MD;  Location: [facility];  Service: Neurosurgery;  Laterality: Right;     Objective      Current Outpatient Medications   Medication Sig Dispense Refill    acyclovir (ZOVIRAX) 400 MG tablet Take 2 tablets by mouth Daily. Take no more than 5 doses a day. 60 tablet 5    cloNIDine (CATAPRES) 0.1 MG tablet Take 1 tablet by mouth 3 (Three) Times a Day As Needed for High Blood Pressure.      dilTIAZem XR (DILACOR XR) 180 MG 24 hr capsule Take 2 capsules by mouth Every Evening.      hydroCHLOROthiazide  "12.5 MG tablet Take 1 tablet by mouth Daily. 30 tablet 0    lisinopril (PRINIVIL,ZESTRIL) 40 MG tablet Take 1 tablet by mouth Daily. 30 tablet 5    multivitamin with minerals tablet tablet Take 1 tablet by mouth Daily.      OXcarbazepine (TRILEPTAL) 600 MG tablet Take 0.5 tablets by mouth 2 (Two) Times a Day for 180 days. 60 tablet 2    promethazine (PHENERGAN) 25 MG tablet Take 1 tablet by mouth Every 8 (Eight) Hours As Needed for Nausea or Vomiting. 30 tablet 0    cephalexin (KEFLEX) 500 MG capsule Take 1 capsule by mouth 2 (Two) Times a Day for 10 days. 20 capsule 0     No current facility-administered medications for this visit.     Vital Signs  Ht 185 cm (72.84\")   Wt 84.4 kg (186 lb)   BMI 24.65 kg/m²   Physical Exam  Vitals and nursing note reviewed.   Constitutional:       General: He is not in acute distress.     Appearance: Normal appearance. He is well-developed, well-groomed and normal weight. He is not ill-appearing, toxic-appearing or diaphoretic.   HENT:      Head: Normocephalic and atraumatic.        Right Ear: Hearing normal.      Left Ear: Hearing normal.   Eyes:      General: Lids are normal.      Extraocular Movements: Extraocular movements intact.      Conjunctiva/sclera: Conjunctivae normal.      Pupils: Pupils are equal, round, and reactive to light.   Neck:      Trachea: Trachea normal.   Cardiovascular:      Rate and Rhythm: Normal rate and regular rhythm.   Pulmonary:      Effort: Pulmonary effort is normal. No tachypnea, bradypnea, accessory muscle usage or respiratory distress.   Abdominal:      Palpations: Abdomen is soft.   Musculoskeletal:      Cervical back: Full passive range of motion without pain and neck supple.   Skin:     General: Skin is warm and dry.   Neurological:      GCS: GCS eye subscore is 4. GCS verbal subscore is 5. GCS motor subscore is 6.      Coordination: Coordination is intact.      Deep Tendon Reflexes:      Reflex Scores:       Tricep reflexes are 2+ on the " right side and 2+ on the left side.       Bicep reflexes are 2+ on the right side and 2+ on the left side.       Brachioradialis reflexes are 2+ on the right side and 2+ on the left side.       Patellar reflexes are 2+ on the right side and 2+ on the left side.       Achilles reflexes are 2+ on the right side and 2+ on the left side.  Psychiatric:         Speech: Speech normal.         Behavior: Behavior normal. Behavior is cooperative.       Neurological Exam  Mental Status  Awake, alert and oriented to person, place and time. Speech is normal. Language is fluent with no aphasia. Attention and concentration are normal.    Cranial Nerves  CN I: Sense of smell is normal.  CN II: Visual acuity is normal.  CN III, IV, VI: Extraocular movements intact bilaterally. Normal lids and orbits bilaterally. Pupils equal round and reactive to light bilaterally.  CN V: Facial sensation is normal.  CN VII: Full and symmetric facial movement.  CN IX, X: Palate elevates symmetrically  CN XI: Shoulder shrug strength is normal.  CN XII: Tongue midline without atrophy or fasciculations.    Motor  Normal muscle bulk throughout. Normal muscle tone.                                               Right                     Left  Toe extension                        5                          5                                             Right                     Left  Deltoid                                   5                          5   Biceps                                   5                          5   Triceps                                  5                          5   Wrist extensor                       5                          5   Iliopsoas                               5                          5   Quadriceps                           5                          5   Anterior tibialis                      5                          5   Posterior tibialis                    5                          5    Sensory  Sensation  is intact to light touch, pinprick, vibration and proprioception in all four extremities.    Reflexes                                            Right                      Left  Brachioradialis                    2+                         2+  Biceps                                 2+                         2+  Triceps                                2+                         2+  Patellar                                2+                         2+  Achilles                                2+                         2+  Right Plantar: downgoing  Left Plantar: downgoing    Right pathological reflexes: Brandon's absent. Ankle clonus absent.  Left pathological reflexes: Brandon's absent. Ankle clonus absent.    Coordination    Finger-to-nose, rapid alternating movements and heel-to-shin normal bilaterally without dysmetria.    Gait  Casual gait is normal including stance, stride, and arm swing.    Incision: WOUND IMAGE - SP craniotomy (04/17/2025)   (Consent to obtain photo of postoperative site for documentation purposes only obtained verbally by Mr. Shaw)    Results Review:   CT Head Without Contrast  Result Date: 4/3/2025  1. Right retrosigmoid craniotomy with postoperative pneumocephalus. Nonspecific 4 mm hyperdensity along the right fourth ventricle. No extra-axial hematoma.   This report was signed and finalized on 4/3/2025 1:12 PM by Dr. Setphanie Vazquez MD.        Assessment/Plan:   Right trigeminal neuralgia  Status post retrosigmoid craniotomy for right trigeminal neuralgia (4/3/2025)  Ciro Shaw is a 64 y.o. male whom presents today for follow-up from a CRANIOTOMY RETROSIGMOID for trigeminal neuralgia, right - Right on 4/3/2025.  Mr. Shaw has done well since we last saw him.  His right facial pain has resolved.  There was mild incisional area edema and scant purulent apparent drainage to the mid incision upon suture removal.  I will provide him with a prescription for Keflex today as he is allergic to  sulfa based antibiotics.  Benefits, risk, adverse effects, use discussed.  We additionally discussed and planned a Trileptal taper.  Given his incisional concerns I would like him to return in 1 week for reevaluation.  Otherwise,  Mr. Shaw knows he may contact the neurosurgical clinic to return sooner for any new or additional concerns and agrees with this plan of care.    Fall risk  Pending sale to [facility] Fall Risk Assessment was completed, and patient is at LOW risk for falls.Assessment completed on:4/17/2025  Fall risk information provided in AVS.    Diagnoses and all orders for this visit:    1. Trigeminal neuralgia (Primary)    2. S/P craniotomy    3. Wound drainage  -     cephalexin (KEFLEX) 500 MG capsule; Take 1 capsule by mouth 2 (Two) Times a Day for 10 days.  Dispense: 20 capsule; Refill: 0      Return for FOLLOWUP WITH GABRIELLE IN 1 WK.    I discussed the patients findings and my recommendations with patient    FRANCO Solis

## 2025-04-21 ENCOUNTER — OFFICE VISIT (OUTPATIENT)
Dept: FAMILY MEDICINE CLINIC | Facility: CLINIC | Age: 65
End: 2025-04-21
Payer: MEDICARE

## 2025-04-21 VITALS
SYSTOLIC BLOOD PRESSURE: 139 MMHG | WEIGHT: 181.4 LBS | RESPIRATION RATE: 18 BRPM | DIASTOLIC BLOOD PRESSURE: 82 MMHG | HEART RATE: 72 BPM | BODY MASS INDEX: 24.04 KG/M2 | OXYGEN SATURATION: 98 % | TEMPERATURE: 97.8 F | HEIGHT: 73 IN

## 2025-04-21 DIAGNOSIS — I10 ESSENTIAL HYPERTENSION: ICD-10-CM

## 2025-04-21 DIAGNOSIS — I10 UNCONTROLLED HYPERTENSION: Primary | ICD-10-CM

## 2025-04-21 RX ORDER — CLONIDINE 0.3 MG/24H
1 PATCH, EXTENDED RELEASE TRANSDERMAL WEEKLY
Qty: 4 EACH | Refills: 2 | Status: SHIPPED | OUTPATIENT
Start: 2025-04-21

## 2025-04-21 RX ORDER — DILTIAZEM HYDROCHLORIDE 180 MG/1
360 CAPSULE, EXTENDED RELEASE ORAL EVERY EVENING
Qty: 60 CAPSULE | Refills: 2 | Status: SHIPPED | OUTPATIENT
Start: 2025-04-21

## 2025-04-21 NOTE — PROGRESS NOTES
"Chief Complaint  Discuss Blood Pressure Medications (Pt presents to discus his blood pressure medications. )    Subjective        Ciro Shaw presents to [facility]  History of Present Illness  Blood pressure readings from home continue to show at least 50% of readings to be elevated. He is asymptomatic. He has to take clonidine at least 1-2 times daily despite increasing his diltiazem to 360 mg daily.    Objective   Vital Signs:  /82 (BP Location: Left arm, Patient Position: Sitting, Cuff Size: Adult)   Pulse 72   Temp 97.8 °F (36.6 °C) (Temporal)   Resp 18   Ht 185 cm (72.84\")   Wt 82.3 kg (181 lb 6.4 oz)   SpO2 98%   BMI 24.04 kg/m²   Estimated body mass index is 24.04 kg/m² as calculated from the following:    Height as of this encounter: 185 cm (72.84\").    Weight as of this encounter: 82.3 kg (181 lb 6.4 oz).    BMI is within normal parameters. No other follow-up for BMI required.        Physical Exam  Vitals and nursing note reviewed.   Constitutional:       General: He is not in acute distress.     Appearance: Normal appearance. He is normal weight. He is not ill-appearing.   HENT:      Head: Normocephalic and atraumatic.   Cardiovascular:      Rate and Rhythm: Normal rate and regular rhythm.      Heart sounds: Normal heart sounds. No murmur heard.  Pulmonary:      Effort: Pulmonary effort is normal.      Breath sounds: Normal breath sounds.   Musculoskeletal:      Right lower leg: No edema.      Left lower leg: No edema.   Skin:     General: Skin is warm and dry.   Neurological:      Mental Status: He is alert and oriented to person, place, and time.        Result Review :                Assessment and Plan   Diagnoses and all orders for this visit:    1. Uncontrolled hypertension (Primary)  -     cloNIDine (CATAPRES-TTS) 0.3 MG/24HR patch; Place 1 patch on the skin as directed by provider 1 (One) Time Per Week.  Dispense: 4 each; Refill: 2  -     dilTIAZem " XR (DILACOR XR) 180 MG 24 hr capsule; Take 2 capsules by mouth Every Evening.  Dispense: 60 capsule; Refill: 2    2. Essential hypertension  -     dilTIAZem XR (DILACOR XR) 180 MG 24 hr capsule; Take 2 capsules by mouth Every Evening.  Dispense: 60 capsule; Refill: 2    Patient will continue to monitor blood pressure and contact provider for continued elevated readings.         Follow Up   Return if symptoms worsen or fail to improve.  Patient was given instructions and counseling regarding his condition or for health maintenance advice. Please see specific information pulled into the AVS if appropriate.     FRANCO Sanchez  This note is electronically signed.

## 2025-04-24 ENCOUNTER — OFFICE VISIT (OUTPATIENT)
Dept: NEUROSURGERY | Facility: CLINIC | Age: 65
End: 2025-04-24
Payer: MEDICARE

## 2025-04-24 VITALS — HEIGHT: 73 IN | WEIGHT: 179 LBS | BODY MASS INDEX: 23.72 KG/M2

## 2025-04-24 DIAGNOSIS — Z98.890 S/P CRANIOTOMY: ICD-10-CM

## 2025-04-24 DIAGNOSIS — G50.0 TRIGEMINAL NEURALGIA: Primary | ICD-10-CM

## 2025-04-24 PROCEDURE — 1159F MED LIST DOCD IN RCRD: CPT | Performed by: NURSE PRACTITIONER

## 2025-04-24 PROCEDURE — 99024 POSTOP FOLLOW-UP VISIT: CPT | Performed by: NURSE PRACTITIONER

## 2025-04-24 PROCEDURE — 1160F RVW MEDS BY RX/DR IN RCRD: CPT | Performed by: NURSE PRACTITIONER

## 2025-04-24 NOTE — PROGRESS NOTES
Chief complaint:   Chief Complaint   Patient presents with    Post-op     Pt is here  for PO visit.         Subjective     HPI:   Ciro Shaw  History of Present Illness  The patient is a 64-year-old male who presents today for continued evaluation following a right retrosigmoid craniotomy for trigeminal neuralgia performed on 04/03/2025. At his last encounter, there was some drainage noticed from his incision concerning for infection. He was placed on Keflex as he is allergic to sulfa containing antibiotics.    He reports that his surgical incision has healed. He has been compliant with his medication regimen, which includes Keflex, with only 2 days remaining.  No recurrence of his right facial pain.  He is titrating off oxcarbazepine as discussed.  He rates the severity of his symptoms 0/10.    PFSH:  Past Medical History:   Diagnosis Date    Arthritis Years    Bradycardia     Hypertension 2024    Seizures 2004    Facial    Trigeminal neuralgia      Past Surgical History:   Procedure Laterality Date    APPENDECTOMY      CRANIOTOMY Right 4/3/2025    Procedure: CRANIOTOMY RETROSIGMOID for trigeminal neuralgia, right;  Surgeon: Dae Shelton MD;  Location: [facility];  Service: Neurosurgery;  Laterality: Right;     Objective      Current Outpatient Medications   Medication Sig Dispense Refill    acyclovir (ZOVIRAX) 400 MG tablet Take 2 tablets by mouth Daily. Take no more than 5 doses a day. 60 tablet 5    cephalexin (KEFLEX) 500 MG capsule Take 1 capsule by mouth 2 (Two) Times a Day for 10 days. 20 capsule 0    cloNIDine (CATAPRES) 0.1 MG tablet Take 1 tablet by mouth 3 (Three) Times a Day As Needed for High Blood Pressure.      cloNIDine (CATAPRES-TTS) 0.3 MG/24HR patch Place 1 patch on the skin as directed by provider 1 (One) Time Per Week. 4 each 2    dilTIAZem XR (DILACOR XR) 180 MG 24 hr capsule Take 2 capsules by mouth Every Evening. 60 capsule 2    hydroCHLOROthiazide 12.5 MG tablet Take 1 tablet  "by mouth Daily. 30 tablet 0    lisinopril (PRINIVIL,ZESTRIL) 40 MG tablet Take 1 tablet by mouth Daily. 30 tablet 5    multivitamin with minerals tablet tablet Take 1 tablet by mouth Daily.      OXcarbazepine (TRILEPTAL) 600 MG tablet Take 0.5 tablets by mouth 2 (Two) Times a Day for 180 days. 60 tablet 2    promethazine (PHENERGAN) 25 MG tablet Take 1 tablet by mouth Every 8 (Eight) Hours As Needed for Nausea or Vomiting. 30 tablet 0     No current facility-administered medications for this visit.     Vital Signs  Ht 185 cm (72.84\")   Wt 81.2 kg (179 lb)   BMI 23.72 kg/m²   Physical Exam  Vitals and nursing note reviewed.   Constitutional:       General: He is not in acute distress.     Appearance: Normal appearance. He is well-developed, well-groomed and normal weight. He is not ill-appearing, toxic-appearing or diaphoretic.   HENT:      Head: Normocephalic and atraumatic.        Right Ear: Hearing normal.      Left Ear: Hearing normal.   Eyes:      General: Lids are normal.      Extraocular Movements: Extraocular movements intact.      Conjunctiva/sclera: Conjunctivae normal.      Pupils: Pupils are equal, round, and reactive to light.   Neck:      Trachea: Trachea normal.   Cardiovascular:      Rate and Rhythm: Normal rate and regular rhythm.   Pulmonary:      Effort: Pulmonary effort is normal. No tachypnea, bradypnea, accessory muscle usage or respiratory distress.   Abdominal:      Palpations: Abdomen is soft.   Musculoskeletal:      Cervical back: Full passive range of motion without pain and neck supple.   Skin:     General: Skin is warm and dry.   Neurological:      Mental Status: He is alert.      GCS: GCS eye subscore is 4. GCS verbal subscore is 5. GCS motor subscore is 6.      Coordination: Coordination is intact.      Deep Tendon Reflexes:      Reflex Scores:       Tricep reflexes are 2+ on the right side and 2+ on the left side.       Bicep reflexes are 2+ on the right side and 2+ on the left " side.       Brachioradialis reflexes are 2+ on the right side and 2+ on the left side.       Patellar reflexes are 2+ on the right side and 2+ on the left side.       Achilles reflexes are 2+ on the right side and 2+ on the left side.  Psychiatric:         Speech: Speech normal.         Behavior: Behavior normal. Behavior is cooperative.       Neurological Exam  Mental Status  Alert. Speech is normal. Language is fluent with no aphasia. Attention and concentration are normal.    Cranial Nerves  CN I: Sense of smell is normal.  CN II: Visual acuity is normal.  CN III, IV, VI: Extraocular movements intact bilaterally. Normal lids and orbits bilaterally. Pupils equal round and reactive to light bilaterally.  CN V: Facial sensation is normal.  CN VII: Full and symmetric facial movement.  CN IX, X: Palate elevates symmetrically  CN XI: Shoulder shrug strength is normal.  CN XII: Tongue midline without atrophy or fasciculations.    Motor  Normal muscle bulk throughout. Normal muscle tone.                                               Right                     Left  Toe extension                        5                          5                                             Right                     Left  Deltoid                                   5                          5   Biceps                                   5                          5   Triceps                                  5                          5   Wrist extensor                       5                          5   Iliopsoas                               5                          5   Quadriceps                           5                          5   Anterior tibialis                      5                          5   Posterior tibialis                    5                          5    Sensory  Sensation is intact to light touch, pinprick, vibration and proprioception in all four extremities.    Reflexes                                             Right                      Left  Brachioradialis                    2+                         2+  Biceps                                 2+                         2+  Triceps                                2+                         2+  Patellar                                2+                         2+  Achilles                                2+                         2+  Right Plantar: downgoing  Left Plantar: downgoing    Right pathological reflexes: Brandon's absent. Ankle clonus absent.  Left pathological reflexes: Brandon's absent. Ankle clonus absent.    Coordination    Finger-to-nose, rapid alternating movements and heel-to-shin normal bilaterally without dysmetria.    Gait  Casual gait is normal including stance, stride, and arm swing.    Incision:  WOUND IMAGE - SP craniotomy (04/17/2025)    WOUND IMAGE - SP craniotomy (04/24/2025)   (Consent to obtain photo of postoperative site for documentation purposes only obtained verbally by Mr. Shaw)    Results Review:   CT Head Without Contrast  Result Date: 4/3/2025  1. Right retrosigmoid craniotomy with postoperative pneumocephalus. Nonspecific 4 mm hyperdensity along the right fourth ventricle. No extra-axial hematoma.   This report was signed and finalized on 4/3/2025 1:12 PM by Dr. Stephanie Vazquez MD.      XR Chest 1 View  Result Date: 3/31/2025  1.  No acute process in the chest.  This report was signed and finalized on 3/31/2025 11:31 AM by Dr Louis York.          Assessment/Plan:   Right trigeminal neuralgia status post craniotomy  Ciro Shaw is a 64 y.o. male whom presents today for follow-up from a CRANIOTOMY RETROSIGMOID for trigeminal neuralgia, right - Right on 4/3/2025.  Mr. Shaw has done well since we last saw him.  His right facial pain has resolved.  His postoperative incision is currently clean, dry, intact, and well-approximated without signs or symptoms of a soft tissue infection.  I recommended he continue antibiotic as  prescribed.  Wash incision daily with soap and water.  Continue to taper off oxcarbazepine.  I would like him to return as scheduled for reevaluation with Dr. Shelton on 5/12/2025.  Mr. Shaw knows he may contact the neurosurgical clinic to return sooner for any new or additional concerns and agrees with this plan of care.    Fall risk  [facility] Fall Risk Assessment was completed, and patient is at LOW risk for falls.Assessment completed on:4/17/2025  Fall risk information provided in AVS.    Diagnoses and all orders for this visit:    1. Trigeminal neuralgia (Primary)    2. S/P craniotomy      Return for Keep scheduled appointment with Dr. Shelton.    I discussed the patients findings and my recommendations with patient    FRANCO Solis

## 2025-05-03 DIAGNOSIS — I10 ESSENTIAL HYPERTENSION: ICD-10-CM

## 2025-05-05 RX ORDER — HYDROCHLOROTHIAZIDE 12.5 MG/1
12.5 TABLET ORAL DAILY
Qty: 30 TABLET | Refills: 0 | OUTPATIENT
Start: 2025-05-05

## 2025-05-12 ENCOUNTER — OFFICE VISIT (OUTPATIENT)
Dept: NEUROSURGERY | Facility: CLINIC | Age: 65
End: 2025-05-12
Payer: MEDICARE

## 2025-05-12 VITALS — WEIGHT: 181 LBS | BODY MASS INDEX: 23.99 KG/M2 | HEIGHT: 73 IN

## 2025-05-12 DIAGNOSIS — Z98.890 S/P CRANIOTOMY: ICD-10-CM

## 2025-05-12 DIAGNOSIS — G50.0 TRIGEMINAL NEURALGIA: Primary | ICD-10-CM

## 2025-05-12 DIAGNOSIS — Z87.891 FORMER CIGARETTE SMOKER: ICD-10-CM

## 2025-05-12 PROCEDURE — 1159F MED LIST DOCD IN RCRD: CPT | Performed by: NEUROLOGICAL SURGERY

## 2025-05-12 PROCEDURE — 99024 POSTOP FOLLOW-UP VISIT: CPT | Performed by: NEUROLOGICAL SURGERY

## 2025-05-12 PROCEDURE — 1160F RVW MEDS BY RX/DR IN RCRD: CPT | Performed by: NEUROLOGICAL SURGERY

## 2025-05-12 NOTE — PROGRESS NOTES
Primary Care Provider: Adore Ornelas APRN    Chief Complaint:   Chief Complaint   Patient presents with    Post-op     Patient is here for 6WK POST OP/CRANI FOR Trigeminal neuralgia. Patient states he is doing well has no pain. Patient does start the right side of his face is now numb since the procedure.       Post-op Follow-up  Pain Control:  No pain  Fever:  No fever  Diet:  Adequate intake  Activity:  Normal  Operative Site Issues: No    Additional information:  I have numbness to right side of face and itching at surgical site    Ciro Shaw is a 64 y.o. male.     History of Present Illness  The patient presents for a follow-up after a microvascular decompression.    He reports persistent numbness on the right side of his face, which he is curious to know if it will resolve as the nerve sheath heals. Additionally, he experiences a sensation akin to swimmer's ear, despite not having engaged in swimming activities. He has regained the ability to sleep on his right ear and notes itching in the area where his hair is regrowing. A few weeks prior, he experienced a transient episode of dizziness and fogginess after exerting himself by moving approximately 14 blocks over a distance of 50 yards. He also mentions a significant weight loss of 12 to 14 pounds during his hospital stay, which he attributes to decreased appetite post-surgery. His current weight is 181 pounds, down from 196 pounds, and he expresses satisfaction with this weight loss.    Interval: Since last visit, he reports numbness on the right side of his face, a sensation akin to swimmer's ear, transient dizziness and fogginess after exertion, and significant weight loss.    Past Surgical History: Microvascular decompression.         Review of Systems   Constitutional: Negative.    HENT: Negative.     Eyes: Negative.    Respiratory: Negative.     Cardiovascular: Negative.    Gastrointestinal: Negative.    Endocrine: Negative.    Genitourinary:  Negative.    Musculoskeletal: Negative.    Skin: Negative.  Negative for poor wound healing.   Allergic/Immunologic: Negative.    Neurological: Negative.    Hematological: Negative.    Psychiatric/Behavioral: Negative.         Past Medical History:   Diagnosis Date    Arthritis Years    Bradycardia     HL (hearing loss) Years    Hypertension 2024    Seizures 2004    Facial    Trigeminal neuralgia     Visual impairment Years       Past Surgical History:   Procedure Laterality Date    APPENDECTOMY      CARDIAC CATHETERIZATION  2020    CRANIOTOMY Right 04/03/2025    Procedure: CRANIOTOMY RETROSIGMOID for trigeminal neuralgia, right;  Surgeon: Dae Shelton MD;  Location: [facility];  Service: Neurosurgery;  Laterality: Right;       Family History: family history includes COPD in his mother; Cancer in his mother; Hearing loss in his father.    Social History:  reports that he quit smoking about 25 years ago. His smoking use included cigarettes. He started smoking about 55 years ago. He has a 60 pack-year smoking history. He has been exposed to tobacco smoke. He has never used smokeless tobacco. He reports current alcohol use. He reports that he does not use drugs.    Medications:    Current Outpatient Medications:     acyclovir (ZOVIRAX) 400 MG tablet, Take 2 tablets by mouth Daily. Take no more than 5 doses a day., Disp: 60 tablet, Rfl: 5    cloNIDine (CATAPRES) 0.1 MG tablet, Take 1 tablet by mouth 3 (Three) Times a Day As Needed for High Blood Pressure., Disp: , Rfl:     cloNIDine (CATAPRES-TTS) 0.3 MG/24HR patch, Place 1 patch on the skin as directed by provider 1 (One) Time Per Week., Disp: 4 each, Rfl: 2    dilTIAZem XR (DILACOR XR) 180 MG 24 hr capsule, Take 2 capsules by mouth Every Evening., Disp: 60 capsule, Rfl: 2    hydroCHLOROthiazide 12.5 MG tablet, Take 1 tablet by mouth Daily., Disp: 30 tablet, Rfl: 0    lisinopril (PRINIVIL,ZESTRIL) 40 MG tablet, Take 1 tablet by mouth Daily., Disp: 30 tablet,  Rfl: 5    multivitamin with minerals tablet tablet, Take 1 tablet by mouth Daily., Disp: , Rfl:     promethazine (PHENERGAN) 25 MG tablet, Take 1 tablet by mouth Every 8 (Eight) Hours As Needed for Nausea or Vomiting., Disp: 30 tablet, Rfl: 0    OXcarbazepine (TRILEPTAL) 600 MG tablet, Take 0.5 tablets by mouth 2 (Two) Times a Day for 180 days., Disp: 60 tablet, Rfl: 2    Allergies:  Sulfa antibiotics    Objective   Physical Exam  Eyes:      General: Lids are normal.      Extraocular Movements: Extraocular movements intact.      Pupils: Pupils are equal, round, and reactive to light.   Neurological:      Mental Status: He is alert.      Coordination: Coordination is intact.      Deep Tendon Reflexes:      Reflex Scores:       Tricep reflexes are 2+ on the right side and 2+ on the left side.       Bicep reflexes are 2+ on the right side and 2+ on the left side.       Brachioradialis reflexes are 2+ on the right side and 2+ on the left side.       Patellar reflexes are 2+ on the right side and 2+ on the left side.       Achilles reflexes are 2+ on the right side and 2+ on the left side.  Psychiatric:         Speech: Speech normal.       Neurological Exam  Mental Status  Alert. Speech is normal. Language is fluent with no aphasia. Attention and concentration are normal.    Cranial Nerves  CN I: Sense of smell is normal.  CN II: Visual acuity is normal.  CN III, IV, VI: Extraocular movements intact bilaterally. Normal lids and orbits bilaterally. Pupils equal round and reactive to light bilaterally.  CN V:  Right: Diminished sensation of the entire right side of the face. Jaw strength is normal. Normal corneal reflex.  Left: Facial sensation is normal on the left. Jaw strength is normal. Normal corneal reflex.  CN VII: Full and symmetric facial movement.  CN IX, X: Palate elevates symmetrically  CN XI: Shoulder shrug strength is normal.  CN XII: Tongue midline without atrophy or fasciculations.    Motor  Normal muscle  bulk throughout. Normal muscle tone.                                               Right                     Left  Toe extension                        5                          5                                             Right                     Left  Deltoid                                   5                          5   Biceps                                   5                          5   Triceps                                  5                          5   Wrist extensor                       5                          5   Iliopsoas                               5                          5   Quadriceps                           5                          5   Anterior tibialis                      5                          5   Posterior tibialis                    5                          5    Sensory  Sensation is intact to light touch, pinprick, vibration and proprioception in all four extremities.    Reflexes                                            Right                      Left  Brachioradialis                    2+                         2+  Biceps                                 2+                         2+  Triceps                                2+                         2+  Patellar                                2+                         2+  Achilles                                2+                         2+  Right Plantar: downgoing  Left Plantar: downgoing    Right pathological reflexes: Brandon's absent. Ankle clonus absent.  Left pathological reflexes: Brandon's absent. Ankle clonus absent.    Coordination    Finger-to-nose, rapid alternating movements and heel-to-shin normal bilaterally without dysmetria.    Gait  Casual gait is normal including stance, stride, and arm swing.        Imaging: (independent review and interpretation)  No radiology results for the last 30 days.      CT Angiogram Head  Order: [id number]  Impression    1. 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 This is nonenhancing on the angiogram and I suspect that  it is thrombosed.  Signed by Dr Louis York on 9/6/2018 3:01 PM    10/14/2024 -noncontrast MRI of the brain personally reviewed.  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  No evidence of mass lesions.  No evidence of MS lesions.        ASSESSMENT and PLAN  Ciro Shaw is a 64 y.o. male with a significant comorbidity of hypertension. He presents for follow-up after a microvascular decompression for trigeminal neuralgia.  He has been doing well and has complete resolution of facial pain.  Physical exam findings significant for some mild right-sided facial numbness . His imaging, including MRI of the brain, shows superior cerebellar artery effacing the right trigeminal nerve without evidence of mass lesions or MS.    Assessment & Plan  1. Postoperative status following microvascular decompression.     - Numbness over the entire right side of the face is reported. This is anticipated to resolve as the nerve sheath heals. The temporalis and masseter muscles are firing, indicating that the nerve is intact and requires time for recovery.     - A muffled sound in the ear, described as similar to \"swimmer's ear,\" is noted. This is likely due to fluid accumulation in the middle ear from drilling into the mastoid air cells. This condition is expected to improve as the fluid resolves over time.     - Dizziness and fogginess after physical exertion are reported, which are not uncommon post-surgery. The patient is advised that these symptoms should gradually improve.     - Weight loss from 196 pounds to 181 pounds is documented. This weight loss is considered normal post-surgery. The patient is encouraged to maintain his current weight as he feels good about it.     - A " photograph of the incision was taken today. The incision appears as expected, and the use of a plate was noted to improve cosmetic appearance over time.     - No further follow-up is required unless new issues arise. The patient is advised to contact the clinic if any problems or concerns develop.          Diagnoses and all orders for this visit:    1. Trigeminal neuralgia (Primary)    2. S/P craniotomy    3. Former cigarette smoker            Return if symptoms worsen or fail to improve.    Thank you for this Consultation and the opportunity to participate in [facility]'s care.    Sincerely,  Dae Shelton MD

## 2025-08-06 DIAGNOSIS — I10 ESSENTIAL HYPERTENSION: ICD-10-CM

## 2025-08-06 DIAGNOSIS — I10 UNCONTROLLED HYPERTENSION: ICD-10-CM

## 2025-08-06 RX ORDER — LISINOPRIL 40 MG/1
40 TABLET ORAL DAILY
Qty: 90 TABLET | Refills: 1 | Status: SHIPPED | OUTPATIENT
Start: 2025-08-06

## 2025-08-06 RX ORDER — DILTIAZEM HYDROCHLORIDE 180 MG/1
360 CAPSULE, EXTENDED RELEASE ORAL EVERY EVENING
Qty: 180 CAPSULE | Refills: 1 | Status: SHIPPED | OUTPATIENT
Start: 2025-08-06 | End: 2025-08-11

## 2025-08-06 RX ORDER — CLONIDINE HYDROCHLORIDE 0.1 MG/1
0.1 TABLET ORAL 3 TIMES DAILY PRN
Qty: 90 TABLET | Refills: 2 | Status: SHIPPED | OUTPATIENT
Start: 2025-08-06

## 2025-08-06 RX ORDER — CLONIDINE 0.3 MG/24H
1 PATCH, EXTENDED RELEASE TRANSDERMAL WEEKLY
Qty: 4 EACH | Refills: 5 | Status: SHIPPED | OUTPATIENT
Start: 2025-08-06 | End: 2025-08-11

## 2025-08-07 ENCOUNTER — TELEPHONE (OUTPATIENT)
Dept: FAMILY MEDICINE CLINIC | Facility: CLINIC | Age: 65
End: 2025-08-07
Payer: MEDICARE

## 2025-08-11 ENCOUNTER — OFFICE VISIT (OUTPATIENT)
Dept: FAMILY MEDICINE CLINIC | Facility: CLINIC | Age: 65
End: 2025-08-11
Payer: MEDICARE

## 2025-08-11 VITALS
DIASTOLIC BLOOD PRESSURE: 76 MMHG | WEIGHT: 184 LBS | HEIGHT: 73 IN | HEART RATE: 73 BPM | SYSTOLIC BLOOD PRESSURE: 114 MMHG | TEMPERATURE: 98.2 F | RESPIRATION RATE: 18 BRPM | OXYGEN SATURATION: 96 % | BODY MASS INDEX: 24.39 KG/M2

## 2025-08-11 DIAGNOSIS — I10 ESSENTIAL HYPERTENSION: Primary | ICD-10-CM

## (undated) DEVICE — 4-PORT MANIFOLD: Brand: NEPTUNE 2

## (undated) DEVICE — GLV SURG SENSICARE W/ALOE PF LF 7.5 STRL

## (undated) DEVICE — 3M™ STERI-DRAPE™ CRANIOTOMY DRAPE WITH IOBAN™ 2 INCISE POUCH 6687: Brand: STERI-DRAPE™ IOBAN™ 2

## (undated) DEVICE — CODMAN® SURGICAL STRIPS 1" X 6" (25MM X 152MM): Brand: CODMAN®

## (undated) DEVICE — 4.0MM PRECISION ROUND

## (undated) DEVICE — TRAP FLD MINIVAC MEGADYNE 100ML

## (undated) DEVICE — SCRWDRVR SMARTO BATRY/PWR TORQ/45NCM 210RPM DISP STRL

## (undated) DEVICE — CONN FLX BREATHE CIRCT

## (undated) DEVICE — 1LYRTR 16FR10ML100%SIL UMS SNP: Brand: MEDLINE INDUSTRIES, INC.

## (undated) DEVICE — INTENDED FOR TISSUE SEPARATION, AND OTHER PROCEDURES THAT REQUIRE A SHARP SURGICAL BLADE TO PUNCTURE OR CUT.: Brand: BARD-PARKER ® STAINLESS STEEL BLADES

## (undated) DEVICE — 3.0MM PRECISION NEURO (MATCH HEAD)

## (undated) DEVICE — GLV SURG DERMASSURE GRN LF PF 7.0

## (undated) DEVICE — 2.3MM TAPERED ROUTER

## (undated) DEVICE — PK CRANI 30

## (undated) DEVICE — VAGINAL PREP TRAY: Brand: MEDLINE INDUSTRIES, INC.

## (undated) DEVICE — SUT NUROLON 4/0 TF18 CR8 I8IN C584D

## (undated) DEVICE — CLTH CLENS READYCLEANSE PERI CARE PK/5

## (undated) DEVICE — ANTIBACTERIAL VIOLET BRAIDED (POLYGLACTIN 910), SYNTHETIC ABSORBABLE SUTURE: Brand: COATED VICRYL

## (undated) DEVICE — PROXIMATE RH ROTATING HEAD SKIN STAPLERS (35 REGULAR) CONTAINS 35 STAINLESS STEEL STAPLES: Brand: PROXIMATE

## (undated) DEVICE — RETR STAY ELNG BLNT 12MM CA/PK/4

## (undated) DEVICE — THE STERILE THE STERIS STERILE CAMERA HANDLE COVERS ARE DESIGNED FOR HARMONYAIR 4K CAMERA MODULE, AND PROVIDE STERILE CONTROL THAT ALLOW FOR INCREASING AND DECREASING ILLUMINATION THROUGH SEVEN INTENSITY LEVELS.

## (undated) DEVICE — APPL DURAPREP IODOPHOR APL 26ML

## (undated) DEVICE — THE STERILE LIGHT HANDLE COVER IS USED WITH STERIS SURGICAL LIGHTING AND VISUALIZATION SYSTEMS.

## (undated) DEVICE — GLV SURG DERMASSURE GRN LF PF 8.0

## (undated) DEVICE — ELECTRD BLD EZ CLN MOD XLNG 2.75IN

## (undated) DEVICE — CATH IV ANGIO FEP 12G 3IN LTBLU 10PK

## (undated) DEVICE — BANDAGE,GAUZE,BULKEE II,4.5"X4.1YD,STRL: Brand: MEDLINE